# Patient Record
Sex: MALE | Race: WHITE | NOT HISPANIC OR LATINO | Employment: UNEMPLOYED | URBAN - METROPOLITAN AREA
[De-identification: names, ages, dates, MRNs, and addresses within clinical notes are randomized per-mention and may not be internally consistent; named-entity substitution may affect disease eponyms.]

---

## 2024-01-28 ENCOUNTER — HOSPITAL ENCOUNTER (INPATIENT)
Facility: HOSPITAL | Age: 54
LOS: 3 days | Discharge: HOME | End: 2024-02-02
Attending: EMERGENCY MEDICINE | Admitting: INTERNAL MEDICINE
Payer: COMMERCIAL

## 2024-01-28 ENCOUNTER — APPOINTMENT (OUTPATIENT)
Dept: RADIOLOGY | Facility: HOSPITAL | Age: 54
End: 2024-01-28
Payer: COMMERCIAL

## 2024-01-28 DIAGNOSIS — Z72.0 TOBACCO USE: ICD-10-CM

## 2024-01-28 DIAGNOSIS — T33.90XA FROSTBITE, INITIAL ENCOUNTER: Primary | ICD-10-CM
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
## 2024-01-28 DIAGNOSIS — T33.522A: ICD-10-CM

## 2024-01-28 DIAGNOSIS — T34.539A: ICD-10-CM

## 2024-01-28 LAB
ANION GAP SERPL CALC-SCNC: 11 MMOL/L
BASOPHILS # BLD AUTO: 0.05 X10*3/UL (ref 0–0.1)
BASOPHILS NFR BLD AUTO: 0.4 %
BUN SERPL-MCNC: 17 MG/DL (ref 8–25)
CALCIUM SERPL-MCNC: 9.4 MG/DL (ref 8.5–10.4)
CHLORIDE SERPL-SCNC: 96 MMOL/L (ref 97–107)
CO2 SERPL-SCNC: 26 MMOL/L (ref 24–31)
CREAT SERPL-MCNC: 0.8 MG/DL (ref 0.4–1.6)
EGFRCR SERPLBLD CKD-EPI 2021: >90 ML/MIN/1.73M*2
EOSINOPHIL # BLD AUTO: 0.04 X10*3/UL (ref 0–0.7)
EOSINOPHIL NFR BLD AUTO: 0.3 %
ERYTHROCYTE [DISTWIDTH] IN BLOOD BY AUTOMATED COUNT: 12.4 % (ref 11.5–14.5)
GLUCOSE SERPL-MCNC: 105 MG/DL (ref 65–99)
HCT VFR BLD AUTO: 48.7 % (ref 41–52)
HGB BLD-MCNC: 16.2 G/DL (ref 13.5–17.5)
IMM GRANULOCYTES # BLD AUTO: 0.05 X10*3/UL (ref 0–0.7)
IMM GRANULOCYTES NFR BLD AUTO: 0.4 % (ref 0–0.9)
LYMPHOCYTES # BLD AUTO: 0.91 X10*3/UL (ref 1.2–4.8)
LYMPHOCYTES NFR BLD AUTO: 7 %
MCH RBC QN AUTO: 33.1 PG (ref 26–34)
MCHC RBC AUTO-ENTMCNC: 33.3 G/DL (ref 32–36)
MCV RBC AUTO: 100 FL (ref 80–100)
MONOCYTES # BLD AUTO: 0.53 X10*3/UL (ref 0.1–1)
MONOCYTES NFR BLD AUTO: 4.1 %
NEUTROPHILS # BLD AUTO: 11.44 X10*3/UL (ref 1.2–7.7)
NEUTROPHILS NFR BLD AUTO: 87.8 %
NRBC BLD-RTO: 0 /100 WBCS (ref 0–0)
PLATELET # BLD AUTO: 262 X10*3/UL (ref 150–450)
POTASSIUM SERPL-SCNC: 4.4 MMOL/L (ref 3.4–5.1)
RBC # BLD AUTO: 4.89 X10*6/UL (ref 4.5–5.9)
SODIUM SERPL-SCNC: 133 MMOL/L (ref 133–145)
WBC # BLD AUTO: 13 X10*3/UL (ref 4.4–11.3)

## 2024-01-28 PROCEDURE — 73130 X-RAY EXAM OF HAND: CPT | Mod: LT,FR

## 2024-01-28 PROCEDURE — G0378 HOSPITAL OBSERVATION PER HR: HCPCS

## 2024-01-28 PROCEDURE — 2500000004 HC RX 250 GENERAL PHARMACY W/ HCPCS (ALT 636 FOR OP/ED): Performed by: PHYSICIAN ASSISTANT

## 2024-01-28 PROCEDURE — 90715 TDAP VACCINE 7 YRS/> IM: CPT | Performed by: PHYSICIAN ASSISTANT

## 2024-01-28 PROCEDURE — 90471 IMMUNIZATION ADMIN: CPT | Performed by: PHYSICIAN ASSISTANT

## 2024-01-28 PROCEDURE — 2500000001 HC RX 250 WO HCPCS SELF ADMINISTERED DRUGS (ALT 637 FOR MEDICARE OP): Performed by: INTERNAL MEDICINE

## 2024-01-28 PROCEDURE — 80048 BASIC METABOLIC PNL TOTAL CA: CPT | Performed by: PHYSICIAN ASSISTANT

## 2024-01-28 PROCEDURE — 99285 EMERGENCY DEPT VISIT HI MDM: CPT | Performed by: EMERGENCY MEDICINE

## 2024-01-28 PROCEDURE — 73130 X-RAY EXAM OF HAND: CPT | Mod: LEFT SIDE | Performed by: RADIOLOGY

## 2024-01-28 PROCEDURE — 85025 COMPLETE CBC W/AUTO DIFF WBC: CPT | Performed by: PHYSICIAN ASSISTANT

## 2024-01-28 PROCEDURE — 96365 THER/PROPH/DIAG IV INF INIT: CPT

## 2024-01-28 PROCEDURE — 36415 COLL VENOUS BLD VENIPUNCTURE: CPT | Performed by: PHYSICIAN ASSISTANT

## 2024-01-28 RX ORDER — HYDROCODONE BITARTRATE AND ACETAMINOPHEN 5; 325 MG/1; MG/1
1 TABLET ORAL EVERY 4 HOURS PRN
Status: DISCONTINUED | OUTPATIENT
Start: 2024-01-28 | End: 2024-02-02 | Stop reason: HOSPADM

## 2024-01-28 RX ORDER — TAMSULOSIN HYDROCHLORIDE 0.4 MG/1
0.4 CAPSULE ORAL
COMMUNITY
Start: 2023-10-06

## 2024-01-28 RX ORDER — LISINOPRIL 20 MG/1
20 TABLET ORAL
COMMUNITY
Start: 2023-10-06

## 2024-01-28 RX ORDER — GABAPENTIN 300 MG/1
300 CAPSULE ORAL 3 TIMES DAILY
COMMUNITY
Start: 2023-12-04

## 2024-01-28 RX ORDER — BUPRENORPHINE AND NALOXONE 8; 2 MG/1; MG/1
1 FILM, SOLUBLE BUCCAL; SUBLINGUAL 3 TIMES DAILY
COMMUNITY
Start: 2020-12-29

## 2024-01-28 RX ADMIN — TETANUS TOXOID, REDUCED DIPHTHERIA TOXOID AND ACELLULAR PERTUSSIS VACCINE, ADSORBED 0.5 ML: 5; 2.5; 8; 8; 2.5 SUSPENSION INTRAMUSCULAR at 21:51

## 2024-01-28 RX ADMIN — HYDROCODONE BITARTRATE AND ACETAMINOPHEN 1 TABLET: 5; 325 TABLET ORAL at 21:55

## 2024-01-28 RX ADMIN — CEFEPIME 1 G: 1 INJECTION, POWDER, FOR SOLUTION INTRAMUSCULAR; INTRAVENOUS at 20:45

## 2024-01-28 ASSESSMENT — LIFESTYLE VARIABLES
REASON UNABLE TO ASSESS: NO
EVER HAD A DRINK FIRST THING IN THE MORNING TO STEADY YOUR NERVES TO GET RID OF A HANGOVER: NO
EVER FELT BAD OR GUILTY ABOUT YOUR DRINKING: NO
HAVE YOU EVER FELT YOU SHOULD CUT DOWN ON YOUR DRINKING: NO
HAVE PEOPLE ANNOYED YOU BY CRITICIZING YOUR DRINKING: NO

## 2024-01-28 ASSESSMENT — COLUMBIA-SUICIDE SEVERITY RATING SCALE - C-SSRS
2. HAVE YOU ACTUALLY HAD ANY THOUGHTS OF KILLING YOURSELF?: NO
6. HAVE YOU EVER DONE ANYTHING, STARTED TO DO ANYTHING, OR PREPARED TO DO ANYTHING TO END YOUR LIFE?: NO
1. IN THE PAST MONTH, HAVE YOU WISHED YOU WERE DEAD OR WISHED YOU COULD GO TO SLEEP AND NOT WAKE UP?: NO

## 2024-01-28 ASSESSMENT — PAIN DESCRIPTION - PAIN TYPE: TYPE: ACUTE PAIN

## 2024-01-28 ASSESSMENT — PAIN SCALES - GENERAL: PAINLEVEL_OUTOF10: 8

## 2024-01-28 ASSESSMENT — PAIN DESCRIPTION - FREQUENCY: FREQUENCY: CONSTANT/CONTINUOUS

## 2024-01-28 ASSESSMENT — PAIN - FUNCTIONAL ASSESSMENT: PAIN_FUNCTIONAL_ASSESSMENT: 0-10

## 2024-01-29 LAB
ALBUMIN SERPL-MCNC: 3.3 G/DL (ref 3.5–5)
ALP BLD-CCNC: 80 U/L (ref 35–125)
ALT SERPL-CCNC: 44 U/L (ref 5–40)
ANION GAP SERPL CALC-SCNC: 12 MMOL/L
APPEARANCE UR: CLEAR
AST SERPL-CCNC: 76 U/L (ref 5–40)
BILIRUB SERPL-MCNC: 0.5 MG/DL (ref 0.1–1.2)
BILIRUB UR STRIP.AUTO-MCNC: NEGATIVE MG/DL
BUN SERPL-MCNC: 16 MG/DL (ref 8–25)
CALCIUM SERPL-MCNC: 9.1 MG/DL (ref 8.5–10.4)
CHLORIDE SERPL-SCNC: 99 MMOL/L (ref 97–107)
CO2 SERPL-SCNC: 24 MMOL/L (ref 24–31)
COLOR UR: YELLOW
CREAT SERPL-MCNC: 0.7 MG/DL (ref 0.4–1.6)
EGFRCR SERPLBLD CKD-EPI 2021: >90 ML/MIN/1.73M*2
ERYTHROCYTE [DISTWIDTH] IN BLOOD BY AUTOMATED COUNT: 12.2 % (ref 11.5–14.5)
GLUCOSE SERPL-MCNC: 106 MG/DL (ref 65–99)
GLUCOSE UR STRIP.AUTO-MCNC: NORMAL MG/DL
HCT VFR BLD AUTO: 46.2 % (ref 41–52)
HGB BLD-MCNC: 15.5 G/DL (ref 13.5–17.5)
KETONES UR STRIP.AUTO-MCNC: ABNORMAL MG/DL
LEUKOCYTE ESTERASE UR QL STRIP.AUTO: NEGATIVE
MCH RBC QN AUTO: 33 PG (ref 26–34)
MCHC RBC AUTO-ENTMCNC: 33.5 G/DL (ref 32–36)
MCV RBC AUTO: 98 FL (ref 80–100)
MUCOUS THREADS #/AREA URNS AUTO: NORMAL /LPF
NITRITE UR QL STRIP.AUTO: NEGATIVE
NRBC BLD-RTO: 0 /100 WBCS (ref 0–0)
PH UR STRIP.AUTO: 6 [PH]
PLATELET # BLD AUTO: 250 X10*3/UL (ref 150–450)
POTASSIUM SERPL-SCNC: 4.2 MMOL/L (ref 3.4–5.1)
PROT SERPL-MCNC: 7.1 G/DL (ref 5.9–7.9)
PROT UR STRIP.AUTO-MCNC: ABNORMAL MG/DL
RBC # BLD AUTO: 4.7 X10*6/UL (ref 4.5–5.9)
RBC # UR STRIP.AUTO: NEGATIVE /UL
RBC #/AREA URNS AUTO: NORMAL /HPF
SODIUM SERPL-SCNC: 135 MMOL/L (ref 133–145)
SP GR UR STRIP.AUTO: 1.03
UROBILINOGEN UR STRIP.AUTO-MCNC: ABNORMAL MG/DL
WBC # BLD AUTO: 8.6 X10*3/UL (ref 4.4–11.3)
WBC #/AREA URNS AUTO: NORMAL /HPF

## 2024-01-29 PROCEDURE — 85027 COMPLETE CBC AUTOMATED: CPT | Performed by: INTERNAL MEDICINE

## 2024-01-29 PROCEDURE — 97161 PT EVAL LOW COMPLEX 20 MIN: CPT | Mod: GP | Performed by: PHYSICAL THERAPIST

## 2024-01-29 PROCEDURE — S4991 NICOTINE PATCH NONLEGEND: HCPCS | Performed by: INTERNAL MEDICINE

## 2024-01-29 PROCEDURE — 2500000002 HC RX 250 W HCPCS SELF ADMINISTERED DRUGS (ALT 637 FOR MEDICARE OP, ALT 636 FOR OP/ED): Performed by: INTERNAL MEDICINE

## 2024-01-29 PROCEDURE — 97165 OT EVAL LOW COMPLEX 30 MIN: CPT | Mod: GO

## 2024-01-29 PROCEDURE — 84075 ASSAY ALKALINE PHOSPHATASE: CPT | Performed by: INTERNAL MEDICINE

## 2024-01-29 PROCEDURE — G0378 HOSPITAL OBSERVATION PER HR: HCPCS

## 2024-01-29 PROCEDURE — 2500000004 HC RX 250 GENERAL PHARMACY W/ HCPCS (ALT 636 FOR OP/ED)

## 2024-01-29 PROCEDURE — 2500000001 HC RX 250 WO HCPCS SELF ADMINISTERED DRUGS (ALT 637 FOR MEDICARE OP): Performed by: INTERNAL MEDICINE

## 2024-01-29 PROCEDURE — 81001 URINALYSIS AUTO W/SCOPE: CPT | Performed by: INTERNAL MEDICINE

## 2024-01-29 PROCEDURE — 36415 COLL VENOUS BLD VENIPUNCTURE: CPT | Performed by: INTERNAL MEDICINE

## 2024-01-29 PROCEDURE — 2500000004 HC RX 250 GENERAL PHARMACY W/ HCPCS (ALT 636 FOR OP/ED): Performed by: INTERNAL MEDICINE

## 2024-01-29 RX ORDER — BUPRENORPHINE AND NALOXONE 8; 2 MG/1; MG/1
1 FILM, SOLUBLE BUCCAL; SUBLINGUAL 3 TIMES DAILY
Status: DISCONTINUED | OUTPATIENT
Start: 2024-01-29 | End: 2024-02-02 | Stop reason: HOSPADM

## 2024-01-29 RX ORDER — VANCOMYCIN 1.75 G/350ML
1250 INJECTION, SOLUTION INTRAVENOUS EVERY 12 HOURS
Status: DISCONTINUED | OUTPATIENT
Start: 2024-01-29 | End: 2024-02-02 | Stop reason: HOSPADM

## 2024-01-29 RX ORDER — POLYETHYLENE GLYCOL 3350 17 G/17G
17 POWDER, FOR SOLUTION ORAL DAILY PRN
Status: DISCONTINUED | OUTPATIENT
Start: 2024-01-29 | End: 2024-02-02 | Stop reason: HOSPADM

## 2024-01-29 RX ORDER — LISINOPRIL 20 MG/1
20 TABLET ORAL DAILY
Status: DISCONTINUED | OUTPATIENT
Start: 2024-01-29 | End: 2024-02-02 | Stop reason: HOSPADM

## 2024-01-29 RX ORDER — GUAIFENESIN/DEXTROMETHORPHAN 100-10MG/5
5 SYRUP ORAL EVERY 4 HOURS PRN
Status: DISCONTINUED | OUTPATIENT
Start: 2024-01-29 | End: 2024-02-02 | Stop reason: HOSPADM

## 2024-01-29 RX ORDER — ACETAMINOPHEN 160 MG/5ML
650 SOLUTION ORAL EVERY 4 HOURS PRN
Status: DISCONTINUED | OUTPATIENT
Start: 2024-01-29 | End: 2024-02-02 | Stop reason: HOSPADM

## 2024-01-29 RX ORDER — IBUPROFEN 200 MG
1 TABLET ORAL DAILY
Status: DISCONTINUED | OUTPATIENT
Start: 2024-01-29 | End: 2024-02-02 | Stop reason: HOSPADM

## 2024-01-29 RX ORDER — MEROPENEM 1 G/1
1 INJECTION, POWDER, FOR SOLUTION INTRAVENOUS EVERY 8 HOURS
Status: DISCONTINUED | OUTPATIENT
Start: 2024-01-29 | End: 2024-02-02 | Stop reason: HOSPADM

## 2024-01-29 RX ORDER — GUAIFENESIN 600 MG/1
600 TABLET, EXTENDED RELEASE ORAL EVERY 12 HOURS PRN
Status: DISCONTINUED | OUTPATIENT
Start: 2024-01-29 | End: 2024-02-02 | Stop reason: HOSPADM

## 2024-01-29 RX ORDER — ACETAMINOPHEN 650 MG/1
650 SUPPOSITORY RECTAL EVERY 4 HOURS PRN
Status: DISCONTINUED | OUTPATIENT
Start: 2024-01-29 | End: 2024-02-02 | Stop reason: HOSPADM

## 2024-01-29 RX ORDER — ACETAMINOPHEN 325 MG/1
650 TABLET ORAL EVERY 4 HOURS PRN
Status: DISCONTINUED | OUTPATIENT
Start: 2024-01-29 | End: 2024-02-02 | Stop reason: HOSPADM

## 2024-01-29 RX ORDER — TAMSULOSIN HYDROCHLORIDE 0.4 MG/1
0.4 CAPSULE ORAL DAILY
Status: DISCONTINUED | OUTPATIENT
Start: 2024-01-29 | End: 2024-02-02 | Stop reason: HOSPADM

## 2024-01-29 RX ORDER — GABAPENTIN 300 MG/1
300 CAPSULE ORAL 3 TIMES DAILY
Status: DISCONTINUED | OUTPATIENT
Start: 2024-01-29 | End: 2024-02-02 | Stop reason: HOSPADM

## 2024-01-29 RX ADMIN — NICOTINE 1 PATCH: 21 PATCH, EXTENDED RELEASE TRANSDERMAL at 11:17

## 2024-01-29 RX ADMIN — HYDROCODONE BITARTRATE AND ACETAMINOPHEN 1 TABLET: 5; 325 TABLET ORAL at 17:47

## 2024-01-29 RX ADMIN — HYDROCODONE BITARTRATE AND ACETAMINOPHEN 1 TABLET: 5; 325 TABLET ORAL at 01:56

## 2024-01-29 RX ADMIN — VANCOMYCIN 1250 MG: 1.75 INJECTION, SOLUTION INTRAVENOUS at 22:00

## 2024-01-29 RX ADMIN — GABAPENTIN 300 MG: 300 CAPSULE ORAL at 15:42

## 2024-01-29 RX ADMIN — MEROPENEM 1 G: 1 INJECTION, POWDER, FOR SOLUTION INTRAVENOUS at 20:55

## 2024-01-29 RX ADMIN — GABAPENTIN 300 MG: 300 CAPSULE ORAL at 09:12

## 2024-01-29 RX ADMIN — TAMSULOSIN HYDROCHLORIDE 0.4 MG: 0.4 CAPSULE ORAL at 09:12

## 2024-01-29 RX ADMIN — HYDROCODONE BITARTRATE AND ACETAMINOPHEN 1 TABLET: 5; 325 TABLET ORAL at 11:17

## 2024-01-29 RX ADMIN — BUPRENORPHINE AND NALOXONE 1 FILM: 8; 2 FILM BUCCAL; SUBLINGUAL at 20:31

## 2024-01-29 RX ADMIN — BUPRENORPHINE AND NALOXONE 1 FILM: 8; 2 FILM BUCCAL; SUBLINGUAL at 09:12

## 2024-01-29 RX ADMIN — MEROPENEM 1 G: 1 INJECTION, POWDER, FOR SOLUTION INTRAVENOUS at 04:14

## 2024-01-29 RX ADMIN — GABAPENTIN 300 MG: 300 CAPSULE ORAL at 20:31

## 2024-01-29 RX ADMIN — MEROPENEM 1 G: 1 INJECTION, POWDER, FOR SOLUTION INTRAVENOUS at 11:17

## 2024-01-29 RX ADMIN — LISINOPRIL 20 MG: 20 TABLET ORAL at 09:12

## 2024-01-29 RX ADMIN — BUPRENORPHINE AND NALOXONE 1 FILM: 8; 2 FILM BUCCAL; SUBLINGUAL at 15:42

## 2024-01-29 SDOH — SOCIAL STABILITY: SOCIAL INSECURITY: DOES ANYONE TRY TO KEEP YOU FROM HAVING/CONTACTING OTHER FRIENDS OR DOING THINGS OUTSIDE YOUR HOME?: NO

## 2024-01-29 SDOH — ECONOMIC STABILITY: HOUSING INSECURITY
IN THE LAST 12 MONTHS, WAS THERE A TIME WHEN YOU DID NOT HAVE A STEADY PLACE TO SLEEP OR SLEPT IN A SHELTER (INCLUDING NOW)?: NO

## 2024-01-29 SDOH — ECONOMIC STABILITY: INCOME INSECURITY: HOW HARD IS IT FOR YOU TO PAY FOR THE VERY BASICS LIKE FOOD, HOUSING, MEDICAL CARE, AND HEATING?: SOMEWHAT HARD

## 2024-01-29 SDOH — ECONOMIC STABILITY: INCOME INSECURITY: IN THE LAST 12 MONTHS, WAS THERE A TIME WHEN YOU WERE NOT ABLE TO PAY THE MORTGAGE OR RENT ON TIME?: NO

## 2024-01-29 SDOH — SOCIAL STABILITY: SOCIAL INSECURITY: DO YOU FEEL ANYONE HAS EXPLOITED OR TAKEN ADVANTAGE OF YOU FINANCIALLY OR OF YOUR PERSONAL PROPERTY?: NO

## 2024-01-29 SDOH — SOCIAL STABILITY: SOCIAL INSECURITY: ARE THERE ANY APPARENT SIGNS OF INJURIES/BEHAVIORS THAT COULD BE RELATED TO ABUSE/NEGLECT?: NO

## 2024-01-29 SDOH — SOCIAL STABILITY: SOCIAL INSECURITY: HAS ANYONE EVER THREATENED TO HURT YOUR FAMILY OR YOUR PETS?: YES

## 2024-01-29 SDOH — SOCIAL STABILITY: SOCIAL INSECURITY: HAVE YOU HAD THOUGHTS OF HARMING ANYONE ELSE?: NO

## 2024-01-29 SDOH — SOCIAL STABILITY: SOCIAL INSECURITY: ABUSE: ADULT

## 2024-01-29 SDOH — ECONOMIC STABILITY: TRANSPORTATION INSECURITY
IN THE PAST 12 MONTHS, HAS THE LACK OF TRANSPORTATION KEPT YOU FROM MEDICAL APPOINTMENTS OR FROM GETTING MEDICATIONS?: NO

## 2024-01-29 SDOH — SOCIAL STABILITY: SOCIAL INSECURITY: ARE YOU OR HAVE YOU BEEN THREATENED OR ABUSED PHYSICALLY, EMOTIONALLY, OR SEXUALLY BY ANYONE?: YES

## 2024-01-29 SDOH — SOCIAL STABILITY: SOCIAL INSECURITY: DO YOU FEEL UNSAFE GOING BACK TO THE PLACE WHERE YOU ARE LIVING?: NO

## 2024-01-29 SDOH — ECONOMIC STABILITY: TRANSPORTATION INSECURITY
IN THE PAST 12 MONTHS, HAS LACK OF TRANSPORTATION KEPT YOU FROM MEETINGS, WORK, OR FROM GETTING THINGS NEEDED FOR DAILY LIVING?: YES

## 2024-01-29 SDOH — SOCIAL STABILITY: SOCIAL INSECURITY: WERE YOU ABLE TO COMPLETE ALL THE BEHAVIORAL HEALTH SCREENINGS?: YES

## 2024-01-29 ASSESSMENT — LIFESTYLE VARIABLES
HOW OFTEN DO YOU HAVE 6 OR MORE DRINKS ON ONE OCCASION: NEVER
PRESCIPTION_ABUSE_PAST_12_MONTHS: NO
AUDIT-C TOTAL SCORE: 0
AUDIT-C TOTAL SCORE: 0
SKIP TO QUESTIONS 9-10: 1
HOW OFTEN DO YOU HAVE A DRINK CONTAINING ALCOHOL: NEVER
SUBSTANCE_ABUSE_PAST_12_MONTHS: YES
HOW MANY STANDARD DRINKS CONTAINING ALCOHOL DO YOU HAVE ON A TYPICAL DAY: PATIENT DOES NOT DRINK

## 2024-01-29 ASSESSMENT — COGNITIVE AND FUNCTIONAL STATUS - GENERAL
DRESSING REGULAR UPPER BODY CLOTHING: A LITTLE
HELP NEEDED FOR BATHING: A LITTLE
DRESSING REGULAR LOWER BODY CLOTHING: A LITTLE
MOBILITY SCORE: 23
PATIENT BASELINE BEDBOUND: NO
CLIMB 3 TO 5 STEPS WITH RAILING: A LITTLE
EATING MEALS: A LITTLE
TOILETING: A LITTLE
TOILETING: A LITTLE
PERSONAL GROOMING: A LITTLE
MOBILITY SCORE: 24
EATING MEALS: A LITTLE
HELP NEEDED FOR BATHING: A LITTLE
EATING MEALS: A LITTLE
DAILY ACTIVITIY SCORE: 18
DRESSING REGULAR LOWER BODY CLOTHING: A LITTLE
DRESSING REGULAR LOWER BODY CLOTHING: A LITTLE
DAILY ACTIVITIY SCORE: 19
DAILY ACTIVITIY SCORE: 18
PERSONAL GROOMING: A LITTLE
PERSONAL GROOMING: A LITTLE
HELP NEEDED FOR BATHING: A LITTLE
DRESSING REGULAR UPPER BODY CLOTHING: A LITTLE
DRESSING REGULAR UPPER BODY CLOTHING: A LITTLE
MOBILITY SCORE: 24

## 2024-01-29 ASSESSMENT — ACTIVITIES OF DAILY LIVING (ADL)
DRESSING YOURSELF: NEEDS ASSISTANCE
WALKS IN HOME: INDEPENDENT
BATHING: INDEPENDENT
HEARING - LEFT EAR: FUNCTIONAL
JUDGMENT_ADEQUATE_SAFELY_COMPLETE_DAILY_ACTIVITIES: YES
ADL_ASSISTANCE: INDEPENDENT
TOILETING: INDEPENDENT
HEARING - RIGHT EAR: FUNCTIONAL
LACK_OF_TRANSPORTATION: NO
BATHING_ASSISTANCE: STAND BY
GROOMING: NEEDS ASSISTANCE
PATIENT'S MEMORY ADEQUATE TO SAFELY COMPLETE DAILY ACTIVITIES?: YES
ADEQUATE_TO_COMPLETE_ADL: YES
FEEDING YOURSELF: INDEPENDENT

## 2024-01-29 ASSESSMENT — PAIN SCALES - GENERAL
PAINLEVEL_OUTOF10: 7
PAINLEVEL_OUTOF10: 5 - MODERATE PAIN
PAINLEVEL_OUTOF10: 6
PAINLEVEL_OUTOF10: 6
PAINLEVEL_OUTOF10: 7
PAINLEVEL_OUTOF10: 6
PAINLEVEL_OUTOF10: 8
PAINLEVEL_OUTOF10: 8
PAINLEVEL_OUTOF10: 6

## 2024-01-29 ASSESSMENT — PAIN - FUNCTIONAL ASSESSMENT
PAIN_FUNCTIONAL_ASSESSMENT: 0-10

## 2024-01-29 ASSESSMENT — PATIENT HEALTH QUESTIONNAIRE - PHQ9
2. FEELING DOWN, DEPRESSED OR HOPELESS: NOT AT ALL
1. LITTLE INTEREST OR PLEASURE IN DOING THINGS: NOT AT ALL
SUM OF ALL RESPONSES TO PHQ9 QUESTIONS 1 & 2: 0

## 2024-01-29 ASSESSMENT — PAIN DESCRIPTION - DESCRIPTORS: DESCRIPTORS: ACHING
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
## 2024-01-29 NOTE — CONSULTS
Nutrition Assessement Note    Nutrition Assessment    Reason for Assessment: Provider consult order    Reason for Hospital Admission:  Chris Toribio is a 53 y.o. male who is admitted for discoloration to the distal digits of his left hand.   Pt not in room at time of visit.     Nutrition History:   N/a         Anthropometrics:  Ht: 182.9 cm (6'), Wt: 119 kg (263 lb), BMI: 35.66  IBW/kg (Dietitian Calculated): 80.91 kg  Percent of IBW: 147.08 %  Adjusted Body Weight (kg): 90.45 kg    Weight Change:              Daily Weight  01/28/24 : 119 kg (263 lb)      Nutrition Focused Physical Exam Findings:                       Nutrition Significant Labs:  Lab Results   Component Value Date    WBC 8.6 01/29/2024    HGB 15.5 01/29/2024    HCT 46.2 01/29/2024     01/29/2024    ALT 44 (H) 01/29/2024    AST 76 (H) 01/29/2024     01/29/2024    K 4.2 01/29/2024    CL 99 01/29/2024    CREATININE 0.70 01/29/2024    BUN 16 01/29/2024    CO2 24 01/29/2024    TSH 0.55 02/10/2023       Current Facility-Administered Medications:     acetaminophen (Tylenol) tablet 650 mg, 650 mg, oral, q4h PRN **OR** acetaminophen (Tylenol) oral liquid 650 mg, 650 mg, oral, q4h PRN **OR** acetaminophen (Tylenol) suppository 650 mg, 650 mg, rectal, q4h PRN, Jacob Gonzalez MD    benzocaine-menthol (Cepastat Sore Throat) 15-3.6 mg lozenge 1 lozenge, 1 lozenge, Mouth/Throat, q2h PRN, Jacob Gonzalez MD    buprenorphine-naloxone (Suboxone) 8-2 mg per SL film 1 Film, 1 Film, sublingual, TID, Jacob Gonzalez MD, 1 Film at 01/29/24 0912    dextromethorphan-guaifenesin (Robitussin DM)  mg/5 mL oral liquid 5 mL, 5 mL, oral, q4h PRN, Jacob Gonzalez MD    gabapentin (Neurontin) capsule 300 mg, 300 mg, oral, TID, Jacob Gonzalez MD, 300 mg at 01/29/24 0912    guaiFENesin (Mucinex) 12 hr tablet 600 mg, 600 mg, oral, q12h PRN, Jacob Gonzalez MD    HYDROcodone-acetaminophen (Norco) 5-325 mg per tablet 1 tablet, 1 tablet,  oral, q4h PRN, Jacob Gonzalez MD, 1 tablet at 01/29/24 1117    lisinopril tablet 20 mg, 20 mg, oral, Daily, Jacob Gonzalez MD, 20 mg at 01/29/24 0912    meropenem (Merrem) in sodium chloride 0.9 % 100 mL IV 1 g, 1 g, intravenous, q8h, Jacob Gonzalez MD, Stopped at 01/29/24 1147    nicotine (Nicoderm CQ) 21 mg/24 hr patch 1 patch, 1 patch, transdermal, Daily, Jacob Gonzalez MD, 1 patch at 01/29/24 1117    polyethylene glycol (Glycolax, Miralax) packet 17 g, 17 g, oral, Daily PRN, Jacob Gonzalez MD    tamsulosin (Flomax) 24 hr capsule 0.4 mg, 0.4 mg, oral, Daily, Jacob Gonzalez MD, 0.4 mg at 01/29/24 0912    Dietary Orders (From admission, onward)       Start     Ordered    01/29/24 0207  May Participate in Room Service  Once        Question:  .  Answer:  Yes    01/29/24 0207    01/29/24 0050  Adult diet Regular  Diet effective now        Question:  Diet type  Answer:  Regular    01/29/24 0049                  Estimated Needs:   Estimated Energy Needs  Total Energy Estimated Needs (kCal):  (7171-6692)  Total Estimated Energy Need per Day (kCal/kg):  (25-28)  Method for Estimating Needs: ABW    Estimated Protein Needs  Total Protein Estimated Needs (g):  ()  Total Protein Estimated Needs (g/kg):  (1-1.2)  Method for Estimating Needs: ABW    Estimated Fluid Needs  Total Fluid Estimated Needs (mL):  (1386-9174)  Method for Estimating Needs: 1 mL/kcal        Nutrition Diagnosis   Nutrition Diagnosis:       Nutrition Diagnosis  Patient has Nutrition Diagnosis: No       Nutrition Interventions/Recommendations   Nutrition Interventions and Recommendations:    Nutrition Prescription:  Individualized Nutrition Prescription Provided for : 1089-0882 kcals,  gm protein via diet    Nutrition Interventions:   Food and/or Nutrient Delivery Interventions  Interventions: Meals and snacks, Medical food supplement  Meals and Snacks: General healthful diet  Goal: Provide diet as ordered  Medical  Food Supplement: Commercial beverage  Goal: Will provide Tanner BID to aid in wound healing. Tanner provides an additional 90 kcals, 2.5 gm protein per serving.    Education Documentation  No documentation found.           Nutrition Monitoring and Evaluation   Monitoring/Evaluation:   Food/Nutrient Related History Monitoring  Monitoring and Evaluation Plan: Energy intake  Energy Intake: Estimated energy intake  Criteria: Pt to consume >/= 75% of estimated needs            Time Spent/Follow-up:   Follow Up  Time Spent (min): 20 minutes  Last Date of Nutrition Visit: 01/29/24  Nutrition Follow-Up Needed?: 3-5 days  Follow up Comment: 2/1/24

## 2024-01-29 NOTE — PROGRESS NOTES
"Vancomycin Dosing by Pharmacy- INITIAL    Chris Toribio is a 53 y.o. year old male who Pharmacy has been consulted for vancomycin dosing for cellulitis, skin and soft tissue. Based on the patient's indication and renal status this patient will be dosed based on a goal AUC of 400-600.     Renal function is currently stable.    Visit Vitals  /70 (BP Location: Left arm, Patient Position: Lying)   Pulse 86   Temp 36.3 °C (97.3 °F) (Oral)   Resp 17        Lab Results   Component Value Date    CREATININE 0.70 01/29/2024    CREATININE 0.80 01/28/2024    CREATININE 0.7 02/13/2023    CREATININE 0.8 02/12/2023    CREATININE 0.7 02/11/2023    CREATININE 0.8 02/10/2023        Patient weight is   Wt Readings from Last 1 Encounters:   01/28/24 119 kg (263 lb)       No results found for: \"CULTURE\"     I/O last 3 completed shifts:  In: 150 (1.3 mL/kg) [IV Piggyback:150]  Out: - (0 mL/kg)   Weight: 119.3 kg     Temp Readings from Last 1 Encounters:   01/29/24 36.3 °C (97.3 °F) (Oral)         Assessment/Plan     Patient will not be given a loading dose.  Will initiate vancomycin maintenance,  1250 mg every 12 hours.  First dose tonight, 1/29 @ 2000    This dosing regimen is predicted by [employer] to result in the following pharmacokinetic parameters:  Loading dose: N/A  Regimen: 1250 mg IV every 12 hours.  Start time: 17:34 on 01/29/2024  Exposure target: AUC24 (range)400-600 mg/L.hr   AUC24,ss: 451 mg/L.hr  Probability of AUC24 > 400: 63 %  Ctrough,ss: 14.3 mg/L  Probability of Ctrough,ss > 20: 22 %  Probability of nephrotoxicity (Lodise CHARLEY 2009): 9 %      Follow-up level will be ordered on 1/30 with AM labs, unless clinically indicated sooner.  Will continue to monitor renal function daily while on vancomycin and order serum creatinine at least every 48 hours if not already ordered.  Follow for continued vancomycin needs, clinical response, and signs/symptoms of toxicity.       Molina Arita, PharmD      "

## 2024-01-29 NOTE — PROGRESS NOTES
Physical Therapy    Physical Therapy Evaluation    Patient Name: Chris Toribio  MRN: [id number]  Today's Date: 1/29/2024   Time Calculation  Start Time: 1355  Stop Time: 1405  Time Calculation (min): 10 min    Assessment/Plan   PT Assessment  End of Session Patient Position:  (seated EOB with call light and needs within reach)  IP OR SWING BED PT PLAN  Inpatient or Swing Bed: Inpatient  PT Plan  PT Plan: PT Eval only  PT Eval Only Reason: At baseline function  PT Discharge Recommendations: No PT needed after discharge  PT Recommended Transfer Status: Independent  PT - OK to Discharge: Yes      Subjective   General Visit Information:  General  Reason for Referral: impaired functional mobility. This 53 year old male presented to the ED from home with c/o pain and discoloration of distal digits of L hand. He was admitted for frost bite of L hand.  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    Home Living:  Home Living  Type of Home: Apartment  Lives With:  (mother who is disabled)  Home Adaptive Equipment: Cane  Home Layout: One level  Home Access: Stairs to enter without rails  Entrance Stairs-Rails: None  Entrance Stairs-Number of Steps: 3  Bathroom Shower/Tub: Walk-in shower  Bathroom Equipment: Grab bars in shower, Shower chair with back (but back is kept off per his mother's preference)  Home Living Comments: pt takes care of his disabled mother    Prior Level of Function:  Prior Function Per Pt/Caregiver Report  Level of [place]: Independent with ADLs and functional transfers, Independent with homemaking with ambulation  ADL Assistance: Independent  Homemaking Assistance: Independent  Ambulatory Assistance: Independent (mod I with straight cane, including on stairs; multiple falls (~6) in past  "year, \"I trip over my own feet\")  Vocational: On disability  Hand Dominance: Right  Prior Function Comments: can drive but does have a vehicle    Precautions:  Precautions  Hearing/Visual Limitations: should wear glasses for distance but does not; hearing WFL  Medical Precautions: Fall precautions      Objective   Pain:  Pain Assessment  Pain Assessment: 0-10  Pain Score: 6  Pain Type: Acute pain  Pain Location: Finger (Comment which one) (2nd & 5th digits)  Pain Orientation: Left  Pain Interventions: Distraction, Ambulation/increased activity    Cognition:  Cognition  Overall Cognitive Status: Within Functional Limits  Orientation Level: Oriented X4    General Assessments:  General Observation  General Observation: L distal 2nd & fifth digits darkly discolored.    Activity Tolerance  Endurance: Endurance does not limit participation in activity    Sensation  Light Touch: Not tested (reported paresthesias in bilateral hands)    Strength  Strength Comments: BLE grossly >/=3+/5 based on observation of functional mobility    Perception  Motor Planning: Appears intact    Coordination  Movements are Fluid and Coordinated: Yes    Postural Control  Postural Control: Within Functional Limits    Static Sitting Balance  Static Sitting-Balance Support: Feet supported, No upper extremity supported  Static Sitting-Level of Assistance: Independent    Static Standing Balance  Static Standing-Balance Support: No upper extremity supported  Static Standing-Level of Assistance: Independent    Dynamic Standing Balance  Dynamic Standing-Balance Support: Right upper extremity supported  Dynamic Standing-Comments: mod I    Functional Assessments:  Transfers  Transfer: Yes  Transfer 1  Technique 1: Sit to stand, Stand to sit  Transfer Level of Assistance 1: Modified independent  Trials/Comments 1: x1 trial at EOB    Ambulation/Gait Training  Ambulation/Gait Training Performed: Yes  Ambulation/Gait Training 1  Surface 1: Level tile  Device " 1: Single point cane  Assistance 1: Modified independent  Comments/Distance (ft) 1: 40 ft using step through pattern with slowed to adequate velocity. Steady gait.    Extremity/Trunk Assessments:  RLE   RLE : Within Functional Limits  LLE   LLE : Within Functional Limits    Outcome Measures:  [facility] Basic Mobility  Turning from your back to your side while in a flat bed without using bedrails: None  Moving from lying on your back to sitting on the side of a flat bed without using bedrails: None  Moving to and from bed to chair (including a wheelchair): None  Standing up from a chair using your arms (e.g. wheelchair or bedside chair): None  To walk in hospital room: None  Climbing 3-5 steps with railing: A little  Basic Mobility - Total Score: 23    Encounter Problems       Encounter Problems (Active)       Pain - Adult              Education Documentation  No documentation found.  Education Comments  No comments found.

## 2024-01-29 NOTE — PROGRESS NOTES
Occupational Therapy    Evaluation    Patient Name: Chris Toribio  MRN: [id number]  : 1970  Today's Date: 24  Time Calculation  Start Time: 1100  Stop Time: 1120  Time Calculation (min): 20 min       Assessment:  OT Assessment: Referral received, chart reviewed, and evaluation complete.  Presents with an inabilty to use left (non dominant hand) as result of frostbite.  If set up is provided ADLs can be completed with Right hand only.  Provided a cane for independent mobility.  No further OT services indicated.     Subjective   Current Problem:  1. Frostbite, initial encounter        2. Frostbite with tissue necrosis of finger, unspecified laterality, initial encounter          General:   OT Received On: 24  General  Reason for Referral: decreased functional status  Referred By: Jacob Gonzalez MD  Past Medical History Relevant to Rehab: Reynauds disease, frostbite on left hand  Family/Caregiver Present: No  Prior to Session Communication: Bedside nurse  Patient Position Received: Bed, 2 rail up  General Comment: 53 year old WM admitted with c/o discoloration distal digits of left hand  Precautions:  Hearing/Visual Limitations: hearing and vision WFL  Vital Signs:     Pain:  Pain Assessment  Pain Assessment: 0-10  Pain Score: 6  Pain Type: Acute pain  Pain Location: Hand  Pain Orientation: Left    Objective   Cognition:  Overall Cognitive Status: Within Functional Limits  Orientation Level: Oriented X4     Home Living:  Type of Home: House  Lives With: Parent(s) (lives with his mother)  Home Layout: Two level  Home Access: Stairs to enter without rails  Entrance Stairs-Rails: None  Entrance Stairs-Number of Steps: 3  Bathroom Shower/Tub: Walk-in shower  Bathroom Toilet: Standard    Prior Function:  Level of Martin: Independent with ADLs and functional transfers, Independent with homemaking with ambulation  Vocational: On disability  Hand Dominance: Right  Prior Function Comments:  Patient takes care of his disabled mother at home     ADL:  Eating Assistance: Other (Comment) (set up)  Grooming Assistance:  (set up)  Bathing Assistance: Stand by  UE Dressing Assistance:  (set up)  LE Dressing Assistance: Stand by  Toileting Assistance with Device: Independent  Activities of Daily Living: LE Dressing  LE Dressing: Yes  Sock Level of Assistance: Independent  LE Dressing Where Assessed: Edge of bed    Toileting  Toileting Level of Assistance: Modified independent  Where Assessed: Toilet    Activity Tolerance:  Endurance: Endurance does not limit participation in activity       Bed Mobility/Transfers: Bed Mobility  Bed Mobility: Yes  Bed Mobility 1  Bed Mobility 1: Supine to sitting  Level of Assistance 1: Independent    Transfers  Transfer: Yes  Transfer 1  Transfer From 1: Bed to  Transfer to 1: Chair with arms  Technique 1: Sit to stand  Transfer Device 1: Cane  Transfer Level of Assistance 1: Close supervision  Transfers 2  Transfer From 2: Stand to  Transfer to 2: Toilet  Technique 2: Stand to sit  Transfer Device 2: Cane  Transfer Level of Assistance 2: Close supervision    Sitting Balance:  Static Sitting Balance  Static Sitting-Balance Support: Feet supported  Static Sitting-Level of Assistance: Independent    Therapy/Activity:  performed functional mobility to and from the bathroom with cane independently    Sensation:  Light Touch: Partial deficits in the LUE    Strength:  Strength Comments: RUe 4+/5, LUE n/t due to discoloration/necrosis    Coordination:  Movements are Fluid and Coordinated: Yes     Hand Function:  Hand Function  Gross Grasp: Impaired (left hand is not functional at this time)  Coordination: Impaired    Outcome Measures: [facility] Daily Activity  Putting on and taking off regular lower body clothing: A little  Bathing (including washing, rinsing, drying): A little  Putting on and taking off regular upper body clothing: A little  Toileting, which includes using toilet, bedpan  or urinal: None  Taking care of personal grooming such as brushing teeth: A little  Eating Meals: A little  Daily Activity - Total Score: 19

## 2024-01-30 PROBLEM — T33.90XA FROSTBITE, INITIAL ENCOUNTER: Status: ACTIVE | Noted: 2024-01-30

## 2024-01-30 LAB
HOLD SPECIMEN: NORMAL
VANCOMYCIN SERPL-MCNC: 8.3 UG/ML (ref 10–20)

## 2024-01-30 PROCEDURE — 2500000004 HC RX 250 GENERAL PHARMACY W/ HCPCS (ALT 636 FOR OP/ED)

## 2024-01-30 PROCEDURE — 2500000004 HC RX 250 GENERAL PHARMACY W/ HCPCS (ALT 636 FOR OP/ED): Performed by: INTERNAL MEDICINE

## 2024-01-30 PROCEDURE — 36415 COLL VENOUS BLD VENIPUNCTURE: CPT

## 2024-01-30 PROCEDURE — 2500000002 HC RX 250 W HCPCS SELF ADMINISTERED DRUGS (ALT 637 FOR MEDICARE OP, ALT 636 FOR OP/ED): Performed by: INTERNAL MEDICINE

## 2024-01-30 PROCEDURE — S4991 NICOTINE PATCH NONLEGEND: HCPCS | Performed by: INTERNAL MEDICINE

## 2024-01-30 PROCEDURE — 2500000001 HC RX 250 WO HCPCS SELF ADMINISTERED DRUGS (ALT 637 FOR MEDICARE OP): Performed by: INTERNAL MEDICINE

## 2024-01-30 PROCEDURE — 1200000002 HC GENERAL ROOM WITH TELEMETRY DAILY

## 2024-01-30 PROCEDURE — 80202 ASSAY OF VANCOMYCIN: CPT

## 2024-01-30 RX ADMIN — BUPRENORPHINE AND NALOXONE 1 FILM: 8; 2 FILM BUCCAL; SUBLINGUAL at 21:08

## 2024-01-30 RX ADMIN — BUPRENORPHINE AND NALOXONE 1 FILM: 8; 2 FILM BUCCAL; SUBLINGUAL at 15:06

## 2024-01-30 RX ADMIN — HYDROCODONE BITARTRATE AND ACETAMINOPHEN 1 TABLET: 5; 325 TABLET ORAL at 23:35

## 2024-01-30 RX ADMIN — MEROPENEM 1 G: 1 INJECTION, POWDER, FOR SOLUTION INTRAVENOUS at 03:27

## 2024-01-30 RX ADMIN — HYDROCODONE BITARTRATE AND ACETAMINOPHEN 1 TABLET: 5; 325 TABLET ORAL at 19:34

## 2024-01-30 RX ADMIN — NICOTINE 1 PATCH: 21 PATCH, EXTENDED RELEASE TRANSDERMAL at 08:41

## 2024-01-30 RX ADMIN — LISINOPRIL 20 MG: 20 TABLET ORAL at 08:41

## 2024-01-30 RX ADMIN — TAMSULOSIN HYDROCHLORIDE 0.4 MG: 0.4 CAPSULE ORAL at 08:41

## 2024-01-30 RX ADMIN — GABAPENTIN 300 MG: 300 CAPSULE ORAL at 15:06

## 2024-01-30 RX ADMIN — VANCOMYCIN 1250 MG: 1.75 INJECTION, SOLUTION INTRAVENOUS at 07:58

## 2024-01-30 RX ADMIN — MEROPENEM 1 G: 1 INJECTION, POWDER, FOR SOLUTION INTRAVENOUS at 11:55

## 2024-01-30 RX ADMIN — BUPRENORPHINE AND NALOXONE 1 FILM: 8; 2 FILM BUCCAL; SUBLINGUAL at 08:41

## 2024-01-30 RX ADMIN — HYDROCODONE BITARTRATE AND ACETAMINOPHEN 1 TABLET: 5; 325 TABLET ORAL at 00:36

## 2024-01-30 RX ADMIN — HYDROCODONE BITARTRATE AND ACETAMINOPHEN 1 TABLET: 5; 325 TABLET ORAL at 13:51

## 2024-01-30 RX ADMIN — GABAPENTIN 300 MG: 300 CAPSULE ORAL at 08:41

## 2024-01-30 RX ADMIN — VANCOMYCIN 1250 MG: 1.75 INJECTION, SOLUTION INTRAVENOUS at 21:09

## 2024-01-30 RX ADMIN — HYDROCODONE BITARTRATE AND ACETAMINOPHEN 1 TABLET: 5; 325 TABLET ORAL at 07:58

## 2024-01-30 RX ADMIN — MEROPENEM 1 G: 1 INJECTION, POWDER, FOR SOLUTION INTRAVENOUS at 21:08

## 2024-01-30 RX ADMIN — GABAPENTIN 300 MG: 300 CAPSULE ORAL at 21:08

## 2024-01-30 ASSESSMENT — PAIN SCALES - GENERAL
PAINLEVEL_OUTOF10: 8
PAINLEVEL_OUTOF10: 8
PAINLEVEL_OUTOF10: 5 - MODERATE PAIN
PAINLEVEL_OUTOF10: 8
PAINLEVEL_OUTOF10: 5 - MODERATE PAIN
PAINLEVEL_OUTOF10: 8

## 2024-01-30 ASSESSMENT — PAIN DESCRIPTION - ORIENTATION: ORIENTATION: LEFT

## 2024-01-30 ASSESSMENT — COGNITIVE AND FUNCTIONAL STATUS - GENERAL
DAILY ACTIVITIY SCORE: 24
MOBILITY SCORE: 24

## 2024-01-30 ASSESSMENT — PAIN - FUNCTIONAL ASSESSMENT
PAIN_FUNCTIONAL_ASSESSMENT: 0-10

## 2024-01-30 ASSESSMENT — PAIN DESCRIPTION - LOCATION: LOCATION: HAND

## 2024-01-30 ASSESSMENT — PAIN DESCRIPTION - DESCRIPTORS
DESCRIPTORS: ACHING
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
## 2024-01-30 NOTE — NURSING NOTE
Assume care of patient. Patient is A&Ox4 up ad linda. Call light in reach. Patient able to make needs known. Patient has frost bite on fingers on left hand.

## 2024-01-30 NOTE — CARE PLAN
The patient's goals for the shift include      The clinical goals for the shift include pain management      Problem: Pain  Goal: Takes deep breaths with improved pain control throughout the shift  Outcome: Progressing  Goal: Turns in bed with improved pain control throughout the shift  Outcome: Progressing  Goal: Walks with improved pain control throughout the shift  Outcome: Progressing  Goal: Performs ADL's with improved pain control throughout shift  Outcome: Progressing  Goal: Participates in PT with improved pain control throughout the shift  Outcome: Progressing  Goal: Free from opioid side effects throughout the shift  Outcome: Progressing  Goal: Free from acute confusion related to pain meds throughout the shift  Outcome: Progressing     Problem: Pain - Adult  Goal: Verbalizes/displays adequate comfort level or baseline comfort level  Outcome: Progressing     Problem: Safety - Adult  Goal: Free from fall injury  Outcome: Progressing     Problem: Discharge Planning  Goal: Discharge to home or other facility with appropriate resources  Outcome: Progressing     Problem: Chronic Conditions and Co-morbidities  Goal: Patient's chronic conditions and co-morbidity symptoms are monitored and maintained or improved  Outcome: Progressing     Problem: Nutrition  Goal: Promote healing  Outcome: Progressing  Goal: Maintain stable weight  Outcome: Progressing  Goal: Reduce weight from edema/fluid  Outcome: Progressing  Goal: Gradual weight gain  Outcome: Progressing

## 2024-01-30 NOTE — CARE PLAN
The patient's goals for the shift include      The clinical goals for the shift include pain management    Over the shift, the patient did not make progress toward the following goals. Barriers to progression include tissue necrosis. Recommendations to address these barriers include follow prescribed pain management regimen.

## 2024-01-30 NOTE — NURSING NOTE
Patient up walking around in room. No s/s of distress or SOB. Patient complains of pain in his left hand. Patient gets scheduled medication to help with pain and prn medication. Patient pleasant and cooperative.

## 2024-01-30 NOTE — PROGRESS NOTES
Chris Toribio is a 53 y.o. male on day 0 of admission presenting with Frostbite of hand, left, initial encounter.       Met with patient at bedside.  Declines homegoing needs. Per ortho note, patient will follow up outpatient in two weeks for possible index finger amputation.  On IV antibiotics.  Will transition to oral antibiotics when discharged.     DC PLAN SECURE                    Rossi Valero RN

## 2024-01-30 NOTE — PROGRESS NOTES
Vancomycin Dosing by Pharmacy- FOLLOW UP    Chris Toribio is a 53 y.o. year old male who Pharmacy has been consulted for vancomycin dosing for cellulitis, skin and soft tissue. Based on the patient's indication and renal status this patient is being dosed based on a goal AUC of 400-600.     Renal function is currently stable.    Current vancomycin dose: 1250 mg given every 12 hours    Estimated vancomycin AUC on current dose: 478 mg/L.hr     Visit Vitals  /64 (BP Location: Right arm, Patient Position: Sitting)   Pulse 94   Temp 37.4 °C (99.3 °F) (Oral)   Resp 19        Lab Results   Component Value Date    CREATININE 0.70 01/29/2024    CREATININE 0.80 01/28/2024    CREATININE 0.7 02/13/2023    CREATININE 0.8 02/12/2023    CREATININE 0.7 02/11/2023    CREATININE 0.8 02/10/2023      I/O last 3 completed shifts:  In: 700 (5.9 mL/kg) [IV Piggyback:700]  Out: - (0 mL/kg)   Weight: 119.3 kg     Assessment/Plan    Within goal AUC range. Continue current vancomycin regimen.    This dosing regimen is predicted by [employer] to result in the following pharmacokinetic parameters:    Regimen: 1250 mg IV every 12 hours.  Exposure target: AUC24 (range)400-600 mg/L.hr   AUC24,ss: 478 mg/L.hr  Probability of AUC24 > 400: 76 %  Ctrough,ss: 14.9 mg/L  Probability of Ctrough,ss > 20: 18 %  Probability of nephrotoxicity (Lodise CHARLEY 2009): 10 %      The next level will be obtained on 2/5 at AM labs. May be obtained sooner if clinically indicated.   Will continue to monitor renal function daily while on vancomycin and order serum creatinine at least every 48 hours if not already ordered.  Follow for continued vancomycin needs, clinical response, and signs/symptoms of toxicity.       Mya Daniels, PharmD

## 2024-01-30 NOTE — PROGRESS NOTES
Chris Toribio is a 53 y.o. male on day 0 of admission presenting with Frostbite of hand, left, initial encounter.    Subjective   Interval History:   Afebrile, no chills  Moderate to severe left hand pain  No chest pain or shortness of breath  No nausea vomiting and diarrhea    Review of Systems   All other systems reviewed and are negative.      Objective   Range of Vitals (last 24 hours)  Heart Rate:  []   Temp:  [36.3 °C (97.3 °F)-37.4 °C (99.3 °F)]   Resp:  [17-19]   BP: (110-126)/(64-87)   SpO2:  [90 %-96 %]   Daily Weight  01/28/24 : 119 kg (263 lb)    Body mass index is 35.67 kg/m².    Physical Exam    Constitutional:       Appearance: Normal appearance.   HENT:      Head: Normocephalic and atraumatic.      Nose: Nose normal.   Eyes:      Extraocular Movements: Extraocular movements intact.      Conjunctiva/sclera: Conjunctivae normal.   Cardiovascular:      Rate and Rhythm: Regular rhythm.      Heart sounds: Normal heart sounds.   Pulmonary:      Breath sounds: Normal breath sounds.   Abdominal:      General: Bowel sounds are normal.      Palpations: Abdomen is soft.   Musculoskeletal:      Cervical back: Normal range of motion and neck supple.   Skin:     Comments: Left thumb, index, and fifth finger distal phalanx necrotic digits   Neurological:      Mental Status: He is alert.   Antibiotics  cefepime (Maxipime) 1 g in dextrose 5 % 50 mL IV  diphth,pertus(acell),tetanus (BoostRIX) 2.5-8-5 Lf-mcg-Lf/0.5mL vaccine 0.5 mL    gabapentin (Neurontin) capsule  lisinopril (Prinivil, Zestril) tablet  tamsulosin (Flomax) 24 hr capsule  HYDROcodone-acetaminophen (Norco) 5-325 mg per tablet 1 tablet  buprenorphine-naloxone (Suboxone) 8-2 mg per SL film 1 Film  gabapentin (Neurontin) capsule 300 mg  lisinopril tablet 20 mg  tamsulosin (Flomax) 24 hr capsule 0.4 mg  acetaminophen (Tylenol) tablet 650 mg  acetaminophen (Tylenol) oral liquid 650 mg  acetaminophen (Tylenol) suppository 650 mg  polyethylene glycol  "(Glycolax, Miralax) packet 17 g  benzocaine-menthol (Cepastat Sore Throat) 15-3.6 mg lozenge 1 lozenge  dextromethorphan-guaifenesin (Robitussin DM)  mg/5 mL oral liquid 5 mL  guaiFENesin (Mucinex) 12 hr tablet 600 mg  meropenem (Merrem) in sodium chloride 0.9 % 100 mL IV 1 g  nicotine (Nicoderm CQ) 21 mg/24 hr patch 1 patch  vancomycin-diluent combo no.1 (Xellia) IVPB 1,250 mg      Relevant Results  Labs  Results from last 72 hours   Lab Units 01/29/24  0551 01/28/24  2104   WBC AUTO x10*3/uL 8.6 13.0*   HEMOGLOBIN g/dL 15.5 16.2   HEMATOCRIT % 46.2 48.7   PLATELETS AUTO x10*3/uL 250 262   NEUTROS PCT AUTO %  --  87.8   LYMPHS PCT AUTO %  --  7.0   MONOS PCT AUTO %  --  4.1   EOS PCT AUTO %  --  0.3     Results from last 72 hours   Lab Units 01/29/24  0551 01/28/24  2104   SODIUM mmol/L 135 133   POTASSIUM mmol/L 4.2 4.4   CHLORIDE mmol/L 99 96*   CO2 mmol/L 24 26   BUN mg/dL 16 17   CREATININE mg/dL 0.70 0.80   GLUCOSE mg/dL 106* 105*   CALCIUM mg/dL 9.1 9.4   ANION GAP mmol/L 12 11   EGFR mL/min/1.73m*2 >90 >90     Results from last 72 hours   Lab Units 01/29/24  0551   ALK PHOS U/L 80   BILIRUBIN TOTAL mg/dL 0.5   PROTEIN TOTAL g/dL 7.1   ALT U/L 44*   AST U/L 76*   ALBUMIN g/dL 3.3*     Estimated Creatinine Clearance: 125 mL/min (by C-G formula based on SCr of 0.7 mg/dL).  No results found for: \"CRP\"  Microbiology  Reviewed  Imaging  XR hand left 3+ views    Result Date: 1/28/2024  STUDY: Hand Radiographs; 1/28/2024, 8:01PM INDICATION: Injury. COMPARISON: None Available. ACCESSION NUMBER(S): [id number] ORDERING CLINICIAN: JORJE KEENAN TECHNIQUE:  Four view(s) of the left hand. FINDINGS:  There is no detectable displaced fracture or dislocation. There is minimal swelling especially apparent in the dorsum of the hand.    Minimal swelling. No detectable displaced fracture. Remainder as above. Signed by Pa Garsia MD     Assessment/Plan   Left thumb, index finger and fifth finger frostbite  Allergy to " penicillin-tolerating meropenem     Continue meropenem-empiric  IV vancomycin-empiric  Pain management.  Hand surgery consult  Monitor evolution of frostbite  Supportive care  Monitor temperature and WBC  Long-term plan is oral antibiotic-considering doxycycline and Levaquin therapy with follow-up with hand surgeon in office         Robert Knapp MD

## 2024-01-30 NOTE — PROGRESS NOTES
Chris Toribio is a 53 y.o. male on day 0 of admission presenting with Frostbite of hand, left, initial encounter.    Subjective   Patient was seen and examined.  Lying in the bed.  Comfortable.  Patient denies any acute distress.  Denies any headache or dizziness.  Still complaining of pain in the left hand.       Objective     Physical Exam  HEENT:  Head externally atraumatic, no pallor, no icterus, extraocular movements intact, pupils reacting to light, oral mucosa moist and throat clear.  Neck:  Supple, no JVP, no palpable adenopathy or thyromegaly.  No carotid bruit.  Chest:  Clear to auscultation and resonant.  Heart:  Regular rate and rhythm, no murmur or gallop could be appreciated.  Abdomen:  Soft, nontender, bowel sounds present, normoactive, no palpable hepatosplenomegaly.  Extremities:  No edema, pulses present, no cyanosis or clubbing.  Necrosis of tip of little finger, third finger, intact finger and thumb.  Worse on the left finger and index finger.  CNS:  Patient alert, oriented to time, place and person.  Power 5/5 all over and deep tendon reflexes symmetrical, cranial nerves 2-12 grossly intact.  Skin:  No active rash.  Musculoskeletal:  No joint swelling or erythema, range of movement normal.  Last Recorded Vitals  Heart Rate:  []   Temp:  [36.3 °C (97.3 °F)-37.4 °C (99.3 °F)]   Resp:  [17-19]   BP: (110-126)/(64-87)   SpO2:  [90 %-96 %]     Intake/Output last 3 Shifts:  I/O last 3 completed shifts:  In: 700 (5.9 mL/kg) [IV Piggyback:700]  Out: - (0 mL/kg)   Weight: 119.3 kg     Relevant Results  No results found for the last 90 days.    Results for orders placed or performed during the hospital encounter of 01/28/24 (from the past 24 hour(s))   Vancomycin   Result Value Ref Range    Vancomycin 8.3 (L) 10.0 - 20.0 ug/mL   Lavender Top   Result Value Ref Range    Extra Tube Hold for add-ons.         Current Facility-Administered Medications:     acetaminophen (Tylenol) tablet 650 mg, 650  mg, oral, q4h PRN **OR** acetaminophen (Tylenol) oral liquid 650 mg, 650 mg, oral, q4h PRN **OR** acetaminophen (Tylenol) suppository 650 mg, 650 mg, rectal, q4h PRN, Jacob Gonzalez MD    benzocaine-menthol (Cepastat Sore Throat) 15-3.6 mg lozenge 1 lozenge, 1 lozenge, Mouth/Throat, q2h PRN, Jacob Gonzalez MD    buprenorphine-naloxone (Suboxone) 8-2 mg per SL film 1 Film, 1 Film, sublingual, TID, Jacob Gonzalez MD, 1 Film at 01/30/24 1506    dextromethorphan-guaifenesin (Robitussin DM)  mg/5 mL oral liquid 5 mL, 5 mL, oral, q4h PRN, Jacob Gonzalez MD    gabapentin (Neurontin) capsule 300 mg, 300 mg, oral, TID, Jacob Gonzalez MD, 300 mg at 01/30/24 1506    guaiFENesin (Mucinex) 12 hr tablet 600 mg, 600 mg, oral, q12h PRN, Jacob Gonzalez MD    HYDROcodone-acetaminophen (Norco) 5-325 mg per tablet 1 tablet, 1 tablet, oral, q4h PRN, Jacbo Gonzalez MD, 1 tablet at 01/30/24 1351    lisinopril tablet 20 mg, 20 mg, oral, Daily, Jacob Gonzalez MD, 20 mg at 01/30/24 0841    meropenem (Merrem) in sodium chloride 0.9 % 100 mL IV 1 g, 1 g, intravenous, q8h, Jacob Gonzalez MD, Stopped at 01/30/24 1225    nicotine (Nicoderm CQ) 21 mg/24 hr patch 1 patch, 1 patch, transdermal, Daily, Jacob Gonzalez MD, 1 patch at 01/30/24 0841    polyethylene glycol (Glycolax, Miralax) packet 17 g, 17 g, oral, Daily PRN, Jacob Gonzalez MD    tamsulosin (Flomax) 24 hr capsule 0.4 mg, 0.4 mg, oral, Daily, Jacob Gonzalez MD, 0.4 mg at 01/30/24 0841    vancomycin-diluent combo no.1 (Xellia) IVPB 1,250 mg, 1,250 mg, intravenous, q12h, Molina Arita, [facility], [facility], Stopped at 01/30/24 0913   Assessment/Plan   Principal Problem:    Frostbite of hand, left, initial encounter  Active Problems:    Frostbite, initial encounter  Current medications.  Continue antibiotic.  Local wound care.  Hand surgery and ID input appreciated.  Discharge soon.         Jacob Gonzalez MD

## 2024-01-30 NOTE — CONSULTS
Reason For Consult  Frostbite injury left hand    History Of Present Illness  Chris Toribio is a 53 y.o. male presenting with proximate injury to the left hand.  Patient states he had a prolonged cold exposure outside approximately 10 days prior injury and left hand.  States he was initially seen by his primary care doctor who was wondering if his Raynaud's phenomenon but present to the hospital when the pain became worse.  Was found to have apparent necrosis of the skin of his fingers and is admitted to the hospital for further treatment here states that the fingers remain fairly painful no numbness or tingling aside from the index finger.  No other treatments for this thus far     Past Medical History  He has a past medical history of HTN (hypertension).    Surgical History  He has a past surgical history that includes Cholecystectomy.     Social History  He reports that he has been smoking cigarettes. He has been smoking an average of 3 packs per day. He has never used smokeless tobacco. He reports current drug use. Drug: Marijuana. He reports that he does not drink alcohol.    Family History  No family history on file.     Allergies  Penicillin    Review of Systems  Denies aside from left hand pain     Physical Exam  Normocephalic atraumatic abdomen soft nontender respirations nonlabored heart rate regular rate and rhythm  Left hand: Necrotic appearing tip of the index finger to portion of the level of middle phalanx with necrotic pad over the tip of the small finger insulin discoloration over the tip of the thumb but no gross tissue necrosis there is absent sensation of the tip of the index finger but intact of the remaining digits able to fully flex and extend the fingers aside from the index finger no erythema no purulence able to be expressed or other open wounds or sores.  Brisk cap refill to the remaining fingers     Last Recorded Vitals  Blood pressure 117/87, pulse 101, temperature 37.3 °C (99.1 °F),  temperature source Oral, resp. rate 18, height 1.829 m (6'), weight 119 kg (263 lb), SpO2 90 %.         Assessment/Plan     53-year-old male with approximate injury to the left hand.  Discussed with the patient at this point we did recommend local wound care for the time being.  Will allow adequate time for tissues to recover but very realistically he will be likely needing an eventual amputation of the tip of the index finger.  Thumb and small finger would likely recover.  Would recommend some Betadine over the necrotic areas of skin once a day for now follow-up in the office in approximately 2 weeks for repeat evaluation and planning for potential amputation on an outpatient basis pending soft tissue recovery at that point.    I spent 30 minutes in the professional and overall care of this patient.      Tirso Craig MD

## 2024-01-31 PROCEDURE — 2500000002 HC RX 250 W HCPCS SELF ADMINISTERED DRUGS (ALT 637 FOR MEDICARE OP, ALT 636 FOR OP/ED): Performed by: INTERNAL MEDICINE

## 2024-01-31 PROCEDURE — 1200000002 HC GENERAL ROOM WITH TELEMETRY DAILY

## 2024-01-31 PROCEDURE — 2500000004 HC RX 250 GENERAL PHARMACY W/ HCPCS (ALT 636 FOR OP/ED): Performed by: INTERNAL MEDICINE

## 2024-01-31 PROCEDURE — 2500000001 HC RX 250 WO HCPCS SELF ADMINISTERED DRUGS (ALT 637 FOR MEDICARE OP): Performed by: INTERNAL MEDICINE

## 2024-01-31 PROCEDURE — S4991 NICOTINE PATCH NONLEGEND: HCPCS | Performed by: INTERNAL MEDICINE

## 2024-01-31 PROCEDURE — 2500000004 HC RX 250 GENERAL PHARMACY W/ HCPCS (ALT 636 FOR OP/ED)

## 2024-01-31 RX ADMIN — HYDROCODONE BITARTRATE AND ACETAMINOPHEN 1 TABLET: 5; 325 TABLET ORAL at 12:04

## 2024-01-31 RX ADMIN — MEROPENEM 1 G: 1 INJECTION, POWDER, FOR SOLUTION INTRAVENOUS at 11:01

## 2024-01-31 RX ADMIN — GABAPENTIN 300 MG: 300 CAPSULE ORAL at 09:08

## 2024-01-31 RX ADMIN — BUPRENORPHINE AND NALOXONE 1 FILM: 8; 2 FILM BUCCAL; SUBLINGUAL at 09:07

## 2024-01-31 RX ADMIN — LISINOPRIL 20 MG: 20 TABLET ORAL at 09:08

## 2024-01-31 RX ADMIN — BUPRENORPHINE AND NALOXONE 1 FILM: 8; 2 FILM BUCCAL; SUBLINGUAL at 14:48

## 2024-01-31 RX ADMIN — VANCOMYCIN 1250 MG: 1.75 INJECTION, SOLUTION INTRAVENOUS at 20:21

## 2024-01-31 RX ADMIN — HYDROCODONE BITARTRATE AND ACETAMINOPHEN 1 TABLET: 5; 325 TABLET ORAL at 16:04

## 2024-01-31 RX ADMIN — TAMSULOSIN HYDROCHLORIDE 0.4 MG: 0.4 CAPSULE ORAL at 09:07

## 2024-01-31 RX ADMIN — GABAPENTIN 300 MG: 300 CAPSULE ORAL at 20:46

## 2024-01-31 RX ADMIN — HYDROCODONE BITARTRATE AND ACETAMINOPHEN 1 TABLET: 5; 325 TABLET ORAL at 07:59

## 2024-01-31 RX ADMIN — GABAPENTIN 300 MG: 300 CAPSULE ORAL at 14:48

## 2024-01-31 RX ADMIN — VANCOMYCIN 1250 MG: 1.75 INJECTION, SOLUTION INTRAVENOUS at 07:54

## 2024-01-31 RX ADMIN — MEROPENEM 1 G: 1 INJECTION, POWDER, FOR SOLUTION INTRAVENOUS at 20:21

## 2024-01-31 RX ADMIN — BUPRENORPHINE AND NALOXONE 1 FILM: 8; 2 FILM BUCCAL; SUBLINGUAL at 20:46

## 2024-01-31 RX ADMIN — NICOTINE 1 PATCH: 21 PATCH, EXTENDED RELEASE TRANSDERMAL at 09:07

## 2024-01-31 RX ADMIN — HYDROCODONE BITARTRATE AND ACETAMINOPHEN 1 TABLET: 5; 325 TABLET ORAL at 03:32

## 2024-01-31 RX ADMIN — HYDROCODONE BITARTRATE AND ACETAMINOPHEN 1 TABLET: 5; 325 TABLET ORAL at 20:21

## 2024-01-31 RX ADMIN — MEROPENEM 1 G: 1 INJECTION, POWDER, FOR SOLUTION INTRAVENOUS at 03:32

## 2024-01-31 ASSESSMENT — PAIN DESCRIPTION - DESCRIPTORS
DESCRIPTORS: ACHING

## 2024-01-31 ASSESSMENT — PAIN - FUNCTIONAL ASSESSMENT
PAIN_FUNCTIONAL_ASSESSMENT: 0-10

## 2024-01-31 ASSESSMENT — COGNITIVE AND FUNCTIONAL STATUS - GENERAL
TOILETING: A LITTLE
DAILY ACTIVITIY SCORE: 19
DRESSING REGULAR UPPER BODY CLOTHING: A LITTLE
CLIMB 3 TO 5 STEPS WITH RAILING: A LITTLE
HELP NEEDED FOR BATHING: A LITTLE
DRESSING REGULAR LOWER BODY CLOTHING: A LITTLE
MOBILITY SCORE: 23
PERSONAL GROOMING: A LITTLE

## 2024-01-31 ASSESSMENT — PAIN SCALES - GENERAL
PAINLEVEL_OUTOF10: 8
PAINLEVEL_OUTOF10: 4
PAINLEVEL_OUTOF10: 4
PAINLEVEL_OUTOF10: 2
PAINLEVEL_OUTOF10: 8
PAINLEVEL_OUTOF10: 8
PAINLEVEL_OUTOF10: 6
PAINLEVEL_OUTOF10: 3
PAINLEVEL_OUTOF10: 7
PAINLEVEL_OUTOF10: 4

## 2024-01-31 ASSESSMENT — PAIN DESCRIPTION - ORIENTATION
ORIENTATION: LEFT

## 2024-01-31 ASSESSMENT — PAIN DESCRIPTION - LOCATION
LOCATION: HAND

## 2024-01-31 ASSESSMENT — ACTIVITIES OF DAILY LIVING (ADL)
EFFECT OF PAIN ON DAILY ACTIVITIES: DISCOMFORT

## 2024-01-31 NOTE — PROGRESS NOTES
Chris Toribio is a 53 y.o. male on day 1 of admission presenting with Frostbite of hand, left, initial encounter.    Subjective   The patient was seen and examined.  Lying in the bed.  Comfortable.  Still complaining of pain in the hand.       Objective     Physical Exam  HEENT:  Head externally atraumatic, no pallor, no icterus, extraocular movements intact, pupils reacting to light, oral mucosa moist and throat clear.  Neck:  Supple, no JVP, no palpable adenopathy or thyromegaly.  No carotid bruit.  Chest:  Clear to auscultation and resonant.  Heart:  Regular rate and rhythm, no murmur or gallop could be appreciated.  Abdomen:  Soft, nontender, bowel sounds present, normoactive, no palpable hepatosplenomegaly.  Extremities: Left index and little finger necrosis.  Necrosis in the thumb and middle finger is improving.    CNS:  Patient alert, oriented to time, place and person.  Power 5/5 all over and deep tendon reflexes symmetrical, cranial nerves 2-12 grossly intact.  Skin:  No active rash.  Musculoskeletal:  No joint swelling or erythema, range of movement normal.  Last Recorded Vitals  Heart Rate:  [79-97]   Temp:  [36.3 °C (97.3 °F)-37.5 °C (99.5 °F)]   Resp:  [18-19]   BP: (112-146)/(67-88)   SpO2:  [89 %-97 %]     Intake/Output last 3 Shifts:  I/O last 3 completed shifts:  In: 1925 (16.1 mL/kg) [P.O.:1025; IV Piggyback:900]  Out: - (0 mL/kg)   Weight: 119.3 kg     Relevant Results  No results found for the last 90 days.    No results found for this or any previous visit (from the past 24 hour(s)).     Current Facility-Administered Medications:     acetaminophen (Tylenol) tablet 650 mg, 650 mg, oral, q4h PRN **OR** acetaminophen (Tylenol) oral liquid 650 mg, 650 mg, oral, q4h PRN **OR** acetaminophen (Tylenol) suppository 650 mg, 650 mg, rectal, q4h PRN, Jacob Gonzalez MD    benzocaine-menthol (Cepastat Sore Throat) 15-3.6 mg lozenge 1 lozenge, 1 lozenge, Mouth/Throat, q2h PRN, Jacob Gonzalez,  MD    buprenorphine-naloxone (Suboxone) 8-2 mg per SL film 1 Film, 1 Film, sublingual, TID, Jacob Gonzalez MD, 1 Film at 01/31/24 0907    dextromethorphan-guaifenesin (Robitussin DM)  mg/5 mL oral liquid 5 mL, 5 mL, oral, q4h PRN, Jacob Gonzalez MD    gabapentin (Neurontin) capsule 300 mg, 300 mg, oral, TID, Jacob Gonzalez MD, 300 mg at 01/31/24 0908    guaiFENesin (Mucinex) 12 hr tablet 600 mg, 600 mg, oral, q12h PRN, Jacob Gonzalez MD    HYDROcodone-acetaminophen (Norco) 5-325 mg per tablet 1 tablet, 1 tablet, oral, q4h PRN, Jacob Gonzalez MD, 1 tablet at 01/31/24 1204    lisinopril tablet 20 mg, 20 mg, oral, Daily, Jacob Gonzalez MD, 20 mg at 01/31/24 0908    meropenem (Merrem) in sodium chloride 0.9 % 100 mL IV 1 g, 1 g, intravenous, q8h, Jacob Gonzalez MD, Stopped at 01/31/24 1131    nicotine (Nicoderm CQ) 21 mg/24 hr patch 1 patch, 1 patch, transdermal, Daily, Jacob Gonzalez MD, 1 patch at 01/31/24 0907    polyethylene glycol (Glycolax, Miralax) packet 17 g, 17 g, oral, Daily PRN, Jacob Gonzalez MD    tamsulosin (Flomax) 24 hr capsule 0.4 mg, 0.4 mg, oral, Daily, Jacob Gonzalez MD, 0.4 mg at 01/31/24 0907    vancomycin-diluent combo no.1 (Xellia) IVPB 1,250 mg, 1,250 mg, intravenous, q12h, Molina Arita [facility], Stopped at 01/31/24 0909   Assessment/Plan   Principal Problem:    Frostbite of hand, left, initial encounter  Active Problems:    Frostbite, initial encounter  Hypertension  Hand pain  BPH  Neuropathy  History of drug abuse    Plan: Continue current medication.  Give supportive care.  Physical therapy.  Pain control.  Antibiotics per ID.  Advised to be compliant with the medication instructions and to quit drugs.  Possible discharge today or tomorrow on oral antibiotics.  Duration of antibiotics per ID.      Jacob Gonzalez MD

## 2024-01-31 NOTE — PROGRESS NOTES
Chris Toribio is a 53 y.o. male on day 1 of admission presenting with Frostbite of hand, left, initial encounter.    Subjective   Interval History:   Patient seen and examined  Mild to severe hand pain  No fever or chills  No chest pain or shortness of breath  No nausea vomiting or diarrhea        Review of Systems   All other systems reviewed and are negative.      Objective   Range of Vitals (last 24 hours)  Heart Rate:  [79-97]   Temp:  [36.3 °C (97.3 °F)-37.5 °C (99.5 °F)]   Resp:  [18-19]   BP: (112-146)/(67-88)   SpO2:  [89 %-97 %]   Daily Weight  01/28/24 : 119 kg (263 lb)    Body mass index is 35.67 kg/m².    Physical Exam  Constitutional:       Appearance: Normal appearance.   HENT:      Head: Normocephalic and atraumatic.      Nose: Nose normal.   Eyes:      Extraocular Movements: Extraocular movements intact.      Conjunctiva/sclera: Conjunctivae normal.   Cardiovascular:      Rate and Rhythm: Regular rhythm.      Heart sounds: Normal heart sounds.   Pulmonary:      Breath sounds: Normal breath sounds.   Abdominal:      General: Bowel sounds are normal.      Palpations: Abdomen is soft.   Musculoskeletal:      Cervical back: Normal range of motion and neck supple.   Skin:     Comments: Left thumb, index, and fifth finger distal phalanx necrotic digits   Neurological:      Mental Status: He is alert.     Antibiotics  cefepime (Maxipime) 1 g in dextrose 5 % 50 mL IV  diphth,pertus(acell),tetanus (BoostRIX) 2.5-8-5 Lf-mcg-Lf/0.5mL vaccine 0.5 mL    gabapentin (Neurontin) capsule  lisinopril (Prinivil, Zestril) tablet  tamsulosin (Flomax) 24 hr capsule  HYDROcodone-acetaminophen (Norco) 5-325 mg per tablet 1 tablet  buprenorphine-naloxone (Suboxone) 8-2 mg per SL film 1 Film  gabapentin (Neurontin) capsule 300 mg  lisinopril tablet 20 mg  tamsulosin (Flomax) 24 hr capsule 0.4 mg  acetaminophen (Tylenol) tablet 650 mg  acetaminophen (Tylenol) oral liquid 650 mg  acetaminophen (Tylenol) suppository 650  "mg  polyethylene glycol (Glycolax, Miralax) packet 17 g  benzocaine-menthol (Cepastat Sore Throat) 15-3.6 mg lozenge 1 lozenge  dextromethorphan-guaifenesin (Robitussin DM)  mg/5 mL oral liquid 5 mL  guaiFENesin (Mucinex) 12 hr tablet 600 mg  meropenem (Merrem) in sodium chloride 0.9 % 100 mL IV 1 g  nicotine (Nicoderm CQ) 21 mg/24 hr patch 1 patch  vancomycin-diluent combo no.1 (Xellia) IVPB 1,250 mg      Relevant Results  Labs  Results from last 72 hours   Lab Units 01/29/24  0551 01/28/24  2104   WBC AUTO x10*3/uL 8.6 13.0*   HEMOGLOBIN g/dL 15.5 16.2   HEMATOCRIT % 46.2 48.7   PLATELETS AUTO x10*3/uL 250 262   NEUTROS PCT AUTO %  --  87.8   LYMPHS PCT AUTO %  --  7.0   MONOS PCT AUTO %  --  4.1   EOS PCT AUTO %  --  0.3     Results from last 72 hours   Lab Units 01/29/24  0551 01/28/24  2104   SODIUM mmol/L 135 133   POTASSIUM mmol/L 4.2 4.4   CHLORIDE mmol/L 99 96*   CO2 mmol/L 24 26   BUN mg/dL 16 17   CREATININE mg/dL 0.70 0.80   GLUCOSE mg/dL 106* 105*   CALCIUM mg/dL 9.1 9.4   ANION GAP mmol/L 12 11   EGFR mL/min/1.73m*2 >90 >90     Results from last 72 hours   Lab Units 01/29/24  0551   ALK PHOS U/L 80   BILIRUBIN TOTAL mg/dL 0.5   PROTEIN TOTAL g/dL 7.1   ALT U/L 44*   AST U/L 76*   ALBUMIN g/dL 3.3*     Estimated Creatinine Clearance: 125 mL/min (by C-G formula based on SCr of 0.7 mg/dL).  No results found for: \"CRP\"  Microbiology  Reviewed  Imaging  XR hand left 3+ views    Result Date: 1/28/2024  STUDY: Hand Radiographs; 1/28/2024, 8:01PM INDICATION: Injury. COMPARISON: None Available. ACCESSION NUMBER(S): [id number] ORDERING CLINICIAN: JORJE C REE TECHNIQUE:  Four view(s) of the left hand. FINDINGS:  There is no detectable displaced fracture or dislocation. There is minimal swelling especially apparent in the dorsum of the hand.    Minimal swelling. No detectable displaced fracture. Remainder as above. Signed by Pa Garsia MD     Assessment/Plan   Left thumb, index finger and fifth finger " frostbite-has risk for infection  Allergy to penicillin-tolerating meropenem     Continue meropenem-empiric  IV vancomycin-empiric  Pain management.  Hand surgery consult  Monitor evolution of frostbite  Supportive care  Monitor temperature and WBC  Long-term plan is oral antibiotic-considering doxycycline and Levaquin therapy with follow-up with hand surgeon in office-2/11/2024         Robert Knapp MD

## 2024-02-01 LAB
ANION GAP SERPL CALC-SCNC: 11 MMOL/L
BUN SERPL-MCNC: 16 MG/DL (ref 8–25)
CALCIUM SERPL-MCNC: 9.2 MG/DL (ref 8.5–10.4)
CHLORIDE SERPL-SCNC: 97 MMOL/L (ref 97–107)
CO2 SERPL-SCNC: 22 MMOL/L (ref 24–31)
CREAT SERPL-MCNC: 0.7 MG/DL (ref 0.4–1.6)
EGFRCR SERPLBLD CKD-EPI 2021: >90 ML/MIN/1.73M*2
ERYTHROCYTE [DISTWIDTH] IN BLOOD BY AUTOMATED COUNT: 12.2 % (ref 11.5–14.5)
GLUCOSE SERPL-MCNC: 110 MG/DL (ref 65–99)
HCT VFR BLD AUTO: 44.6 % (ref 41–52)
HGB BLD-MCNC: 14.8 G/DL (ref 13.5–17.5)
MCH RBC QN AUTO: 33 PG (ref 26–34)
MCHC RBC AUTO-ENTMCNC: 33.2 G/DL (ref 32–36)
MCV RBC AUTO: 100 FL (ref 80–100)
NRBC BLD-RTO: 0 /100 WBCS (ref 0–0)
PLATELET # BLD AUTO: 193 X10*3/UL (ref 150–450)
POTASSIUM SERPL-SCNC: 4.6 MMOL/L (ref 3.4–5.1)
RBC # BLD AUTO: 4.48 X10*6/UL (ref 4.5–5.9)
SODIUM SERPL-SCNC: 130 MMOL/L (ref 133–145)
WBC # BLD AUTO: 6.5 X10*3/UL (ref 4.4–11.3)

## 2024-02-01 PROCEDURE — 36415 COLL VENOUS BLD VENIPUNCTURE: CPT | Performed by: NURSE PRACTITIONER

## 2024-02-01 PROCEDURE — 2500000002 HC RX 250 W HCPCS SELF ADMINISTERED DRUGS (ALT 637 FOR MEDICARE OP, ALT 636 FOR OP/ED): Performed by: INTERNAL MEDICINE

## 2024-02-01 PROCEDURE — 2500000004 HC RX 250 GENERAL PHARMACY W/ HCPCS (ALT 636 FOR OP/ED): Performed by: INTERNAL MEDICINE

## 2024-02-01 PROCEDURE — 85027 COMPLETE CBC AUTOMATED: CPT | Performed by: NURSE PRACTITIONER

## 2024-02-01 PROCEDURE — 2500000005 HC RX 250 GENERAL PHARMACY W/O HCPCS: Performed by: INTERNAL MEDICINE

## 2024-02-01 PROCEDURE — 1200000002 HC GENERAL ROOM WITH TELEMETRY DAILY

## 2024-02-01 PROCEDURE — 2500000004 HC RX 250 GENERAL PHARMACY W/ HCPCS (ALT 636 FOR OP/ED)

## 2024-02-01 PROCEDURE — 80048 BASIC METABOLIC PNL TOTAL CA: CPT | Performed by: NURSE PRACTITIONER

## 2024-02-01 PROCEDURE — S4991 NICOTINE PATCH NONLEGEND: HCPCS | Performed by: INTERNAL MEDICINE

## 2024-02-01 PROCEDURE — 2500000001 HC RX 250 WO HCPCS SELF ADMINISTERED DRUGS (ALT 637 FOR MEDICARE OP): Performed by: INTERNAL MEDICINE

## 2024-02-01 RX ORDER — ONDANSETRON 4 MG/1
4 TABLET, ORALLY DISINTEGRATING ORAL EVERY 8 HOURS PRN
Status: DISCONTINUED | OUTPATIENT
Start: 2024-02-01 | End: 2024-02-02 | Stop reason: HOSPADM

## 2024-02-01 RX ADMIN — MEROPENEM 1 G: 1 INJECTION, POWDER, FOR SOLUTION INTRAVENOUS at 05:02

## 2024-02-01 RX ADMIN — BUPRENORPHINE AND NALOXONE 1 FILM: 8; 2 FILM BUCCAL; SUBLINGUAL at 08:21

## 2024-02-01 RX ADMIN — TAMSULOSIN HYDROCHLORIDE 0.4 MG: 0.4 CAPSULE ORAL at 08:21

## 2024-02-01 RX ADMIN — MEROPENEM 1 G: 1 INJECTION, POWDER, FOR SOLUTION INTRAVENOUS at 13:57

## 2024-02-01 RX ADMIN — HYDROCODONE BITARTRATE AND ACETAMINOPHEN 1 TABLET: 5; 325 TABLET ORAL at 05:08

## 2024-02-01 RX ADMIN — HYDROCODONE BITARTRATE AND ACETAMINOPHEN 1 TABLET: 5; 325 TABLET ORAL at 09:32

## 2024-02-01 RX ADMIN — GABAPENTIN 300 MG: 300 CAPSULE ORAL at 21:04

## 2024-02-01 RX ADMIN — BUPRENORPHINE AND NALOXONE 1 FILM: 8; 2 FILM BUCCAL; SUBLINGUAL at 21:04

## 2024-02-01 RX ADMIN — GABAPENTIN 300 MG: 300 CAPSULE ORAL at 08:21

## 2024-02-01 RX ADMIN — GABAPENTIN 300 MG: 300 CAPSULE ORAL at 14:00

## 2024-02-01 RX ADMIN — BUPRENORPHINE AND NALOXONE 1 FILM: 8; 2 FILM BUCCAL; SUBLINGUAL at 14:00

## 2024-02-01 RX ADMIN — NICOTINE 1 PATCH: 21 PATCH, EXTENDED RELEASE TRANSDERMAL at 08:26

## 2024-02-01 RX ADMIN — HYDROCODONE BITARTRATE AND ACETAMINOPHEN 1 TABLET: 5; 325 TABLET ORAL at 21:04

## 2024-02-01 RX ADMIN — MEROPENEM 1 G: 1 INJECTION, POWDER, FOR SOLUTION INTRAVENOUS at 21:04

## 2024-02-01 RX ADMIN — VANCOMYCIN 1250 MG: 1.75 INJECTION, SOLUTION INTRAVENOUS at 08:21

## 2024-02-01 RX ADMIN — ONDANSETRON 4 MG: 4 TABLET, ORALLY DISINTEGRATING ORAL at 15:16

## 2024-02-01 RX ADMIN — ONDANSETRON 4 MG: 4 TABLET, ORALLY DISINTEGRATING ORAL at 01:45

## 2024-02-01 RX ADMIN — HYDROCODONE BITARTRATE AND ACETAMINOPHEN 1 TABLET: 5; 325 TABLET ORAL at 17:34

## 2024-02-01 RX ADMIN — LISINOPRIL 20 MG: 20 TABLET ORAL at 08:21

## 2024-02-01 RX ADMIN — HYDROCODONE BITARTRATE AND ACETAMINOPHEN 1 TABLET: 5; 325 TABLET ORAL at 13:57

## 2024-02-01 RX ADMIN — VANCOMYCIN 1250 MG: 1.75 INJECTION, SOLUTION INTRAVENOUS at 21:07

## 2024-02-01 RX ADMIN — HYDROCODONE BITARTRATE AND ACETAMINOPHEN 1 TABLET: 5; 325 TABLET ORAL at 00:29

## 2024-02-01 ASSESSMENT — COGNITIVE AND FUNCTIONAL STATUS - GENERAL
DAILY ACTIVITIY SCORE: 21
DRESSING REGULAR UPPER BODY CLOTHING: A LITTLE
MOBILITY SCORE: 23
HELP NEEDED FOR BATHING: A LITTLE
CLIMB 3 TO 5 STEPS WITH RAILING: A LITTLE
DRESSING REGULAR LOWER BODY CLOTHING: A LITTLE

## 2024-02-01 ASSESSMENT — PAIN SCALES - GENERAL
PAINLEVEL_OUTOF10: 9
PAINLEVEL_OUTOF10: 0 - NO PAIN
PAINLEVEL_OUTOF10: 5 - MODERATE PAIN
PAINLEVEL_OUTOF10: 6
PAINLEVEL_OUTOF10: 3
PAINLEVEL_OUTOF10: 10 - WORST POSSIBLE PAIN
PAINLEVEL_OUTOF10: 10 - WORST POSSIBLE PAIN

## 2024-02-01 ASSESSMENT — PAIN - FUNCTIONAL ASSESSMENT
PAIN_FUNCTIONAL_ASSESSMENT: 0-10
PAIN_FUNCTIONAL_ASSESSMENT: FLACC (FACE, LEGS, ACTIVITY, CRY, CONSOLABILITY)
PAIN_FUNCTIONAL_ASSESSMENT: FLACC (FACE, LEGS, ACTIVITY, CRY, CONSOLABILITY)
PAIN_FUNCTIONAL_ASSESSMENT: 0-10
PAIN_FUNCTIONAL_ASSESSMENT: 0-10

## 2024-02-01 ASSESSMENT — PAIN DESCRIPTION - DESCRIPTORS
DESCRIPTORS: ACHING
DESCRIPTORS: ACHING;SQUEEZING
DESCRIPTORS: THROBBING

## 2024-02-01 ASSESSMENT — PAIN DESCRIPTION - ORIENTATION: ORIENTATION: LEFT

## 2024-02-01 ASSESSMENT — PAIN DESCRIPTION - LOCATION: LOCATION: HAND

## 2024-02-01 NOTE — NURSING NOTE
Assumed care of pt.BSSR received from previous nurse.Pt observed sitting up in bed,A&O x 3.Respiration regular and unlabored on room air. Heart monitor on. Plan of care discussed. No concerns or needs voiced at this time.Call light within reach.Bed in lowest position,locked.Will continue current plan of care and update as necessary.

## 2024-02-01 NOTE — PROGRESS NOTES
Chris Toribio is a 53 y.o. male on day 2 of admission presenting with Frostbite of hand, left, initial encounter.    Subjective   Patient seen and examined.  Resting in bed in no acute distress.  Awake alert oriented x 3.  Complains of worsening left thumb discoloration, swelling and pain.  Left middle and 5th finger pain.  Nausea, resolved with Zofran, no vomiting.  Nausea resolved.  Tolerating diet.    Objective     Physical Exam  Vitals reviewed.   Constitutional:       General: He is not in acute distress.     Appearance: Normal appearance. He is obese. He is not ill-appearing or toxic-appearing.   HENT:      Head: Normocephalic and atraumatic.      Right Ear: Tympanic membrane normal.      Left Ear: Tympanic membrane normal.      Nose: Nose normal.      Mouth/Throat:      Mouth: Mucous membranes are moist.      Pharynx: Oropharynx is clear.   Eyes:      Extraocular Movements: Extraocular movements intact.      Conjunctiva/sclera: Conjunctivae normal.      Pupils: Pupils are equal, round, and reactive to light.   Cardiovascular:      Rate and Rhythm: Normal rate and regular rhythm.      Pulses: Normal pulses.      Heart sounds: Normal heart sounds.   Pulmonary:      Effort: Pulmonary effort is normal. No respiratory distress.      Breath sounds: Normal breath sounds. No wheezing, rhonchi or rales.      Comments: Room air  Abdominal:      General: Bowel sounds are normal.      Palpations: Abdomen is soft.   Genitourinary:     Comments: Deferred  Musculoskeletal:         General: Swelling and tenderness present. Normal range of motion.      Cervical back: Normal range of motion and neck supple.   Skin:     General: Skin is warm and dry.      Capillary Refill: Capillary refill takes less than 2 seconds.      Comments: Left thumb discoloration, swelling, tenderness.  Left index finger necrosis.  Left middle finger discoloration, swelling, tenderness.  Left 5th finger discoloration, swelling, tenderness    Neurological:      General: No focal deficit present.      Mental Status: He is alert and oriented to person, place, and time.   Psychiatric:         Mood and Affect: Mood normal.         Behavior: Behavior normal.       Last Recorded Vitals  Blood pressure 122/79, pulse 84, temperature 36.5 °C (97.7 °F), temperature source Oral, resp. rate 18, height 1.829 m (6'), weight 119 kg (263 lb), SpO2 94 %.    Intake/Output last 3 Shifts:  I/O last 3 completed shifts:  In: 775 (6.5 mL/kg) [P.O.:675; IV Piggyback:100]  Out: - (0 mL/kg)   Weight: 119.3 kg     Telemetry normal sinus rhythm rate 80's    Relevant Results  Results for orders placed or performed during the hospital encounter of 01/28/24 (from the past 24 hour(s))   Basic Metabolic Panel   Result Value Ref Range    Glucose 110 (H) 65 - 99 mg/dL    Sodium 130 (L) 133 - 145 mmol/L    Potassium 4.6 3.4 - 5.1 mmol/L    Chloride 97 97 - 107 mmol/L    Bicarbonate 22 (L) 24 - 31 mmol/L    Urea Nitrogen 16 8 - 25 mg/dL    Creatinine 0.70 0.40 - 1.60 mg/dL    eGFR >90 >60 mL/min/1.73m*2    Calcium 9.2 8.5 - 10.4 mg/dL    Anion Gap 11 <=19 mmol/L   CBC   Result Value Ref Range    WBC 6.5 4.4 - 11.3 x10*3/uL    nRBC 0.0 0.0 - 0.0 /100 WBCs    RBC 4.48 (L) 4.50 - 5.90 x10*6/uL    Hemoglobin 14.8 13.5 - 17.5 g/dL    Hematocrit 44.6 41.0 - 52.0 %     80 - 100 fL    MCH 33.0 26.0 - 34.0 pg    MCHC 33.2 32.0 - 36.0 g/dL    RDW 12.2 11.5 - 14.5 %    Platelets 193 150 - 450 x10*3/uL     No results found.    Scheduled medications  buprenorphine-naloxone, 1 Film, sublingual, TID  gabapentin, 300 mg, oral, TID  lisinopril, 20 mg, oral, Daily  meropenem, 1 g, intravenous, q8h  nicotine, 1 patch, transdermal, Daily  tamsulosin, 0.4 mg, oral, Daily  vancomycin-diluent combo no.1, 1,250 mg, intravenous, q12h      Continuous medications     PRN medications  PRN medications: acetaminophen **OR** acetaminophen **OR** acetaminophen, benzocaine-menthol, dextromethorphan-guaifenesin,  guaiFENesin, HYDROcodone-acetaminophen, ondansetron ODT, polyethylene glycol    ASSESSMENT:  Left hand frostbite  Neuropathy  Hypertension  BPH  Drug abuse    PLAN:  Symptoms persist.  Increased left thumb discoloration, swelling, pain.  Infectious disease, Orthopedic surgery input appreciated.  IV antibiotics Vancomycin, Meropenem.  Long term plan oral antibiotics Doxycycline and Levaquin with outpatient follow up with Orthopedic surgery.  Monitor temperature and white blood cell count.  Local care.  Betadine over necrotic skin once daily.  Orthopedic surgery follow up in 2 weeks, potential amputation pending recovery.  P.r.n analgesics.  PT/OT.  Fall precautions.  Up with assistance.  Supportive care.  Patient reassured.  Case management following for discharge planning.  Discharge plan home.  Monitor.  Discussed with Dr. Gonzalez.    JAMARCUS Rose-CNP

## 2024-02-01 NOTE — CARE PLAN
Problem: Pain  Goal: Takes deep breaths with improved pain control throughout the shift  Outcome: Progressing  Goal: Turns in bed with improved pain control throughout the shift  Outcome: Progressing  Goal: Walks with improved pain control throughout the shift  Outcome: Progressing  Goal: Performs ADL's with improved pain control throughout shift  Outcome: Progressing  Goal: Participates in PT with improved pain control throughout the shift  Outcome: Progressing  Goal: Free from opioid side effects throughout the shift  Outcome: Progressing  Goal: Free from acute confusion related to pain meds throughout the shift  Outcome: Progressing     Problem: Pain - Adult  Goal: Verbalizes/displays adequate comfort level or baseline comfort level  Outcome: Progressing     Problem: Safety - Adult  Goal: Free from fall injury  Outcome: Progressing     Problem: Discharge Planning  Goal: Discharge to home or other facility with appropriate resources  Outcome: Progressing     Problem: Chronic Conditions and Co-morbidities  Goal: Patient's chronic conditions and co-morbidity symptoms are monitored and maintained or improved  Outcome: Progressing     Problem: Nutrition  Goal: Promote healing  Outcome: Progressing  Goal: Maintain stable weight  Outcome: Progressing  Goal: Reduce weight from edema/fluid  Outcome: Progressing  Goal: Gradual weight gain  Outcome: Progressing     Problem: Skin  Goal: Decreased wound size/increased tissue granulation at next dressing change  Outcome: Progressing  Flowsheets (Taken 1/31/2024 2308)  Decreased wound size/increased tissue granulation at next dressing change:   Promote sleep for wound healing   Protective dressings over bony prominences   Utilize specialty bed per algorithm  Goal: Participates in plan/prevention/treatment measures  Outcome: Progressing  Flowsheets (Taken 1/31/2024 2308)  Participates in plan/prevention/treatment measures:   Discuss with provider PT/OT consult   Elevate  heels   Increase activity/out of bed for meals  Goal: Prevent/manage excess moisture  Outcome: Progressing  Flowsheets (Taken 1/31/2024 2308)  Prevent/manage excess moisture:   Cleanse incontinence/protect with barrier cream   Follow provider orders for dressing changes   Moisturize dry skin   Monitor for/manage infection if present   Use wicking fabric (obtain order)  Goal: Prevent/minimize sheer/friction injuries  Outcome: Progressing  Flowsheets (Taken 1/31/2024 2308)  Prevent/minimize sheer/friction injuries:   Complete micro-shifts as needed if patient unable. Adjust patient position to relieve pressure points, not a full turn   HOB 30 degrees or less   Increase activity/out of bed for meals   Turn/reposition every 2 hours/use positioning/transfer devices   Use pull sheet   Utilize specialty bed per algorithm  Goal: Promote/optimize nutrition  Outcome: Progressing  Flowsheets (Taken 1/31/2024 2308)  Promote/optimize nutrition:   Assist with feeding   Consume > 50% meals/supplements   Discuss with provider if NPO > 2 days   Monitor/record intake including meals   Offer water/supplements/favorite foods   Reassess MST if dietician not consulted  Goal: Promote skin healing  Outcome: Progressing  Flowsheets (Taken 1/31/2024 2308)  Promote skin healing:   Assess skin/pad under line(s)/device(s)   Ensure correct size (line/device) and apply per  instructions   Rotate device position/do not position patient on device   Protective dressings over bony prominences   Turn/reposition every 2 hours/use positioning/transfer devices   The patient's goals for the shift include      The clinical goals for the shift include pan management    Over the shift, the patient did not make progress toward the following goals. Barriers to progression include . Recommendations to address these barriers include .

## 2024-02-01 NOTE — PROGRESS NOTES
Chris Toribio is a 53 y.o. male on day 2 of admission presenting with Frostbite of hand, left, initial encounter.    Subjective   Interval History:   Reports Mild to severe left hand pain  Afebrile, reports chills  Denies chest pain or shortness of breath  Reported nausea, resolved after zofran  No vomiting or diarrhea            Objective   Range of Vitals (last 24 hours)  Heart Rate:  [80-91]   Temp:  [36.4 °C (97.5 °F)-37.1 °C (98.8 °F)]   Resp:  [18-19]   BP: (120-136)/(63-79)   SpO2:  [94 %-95 %]   Daily Weight  01/28/24 : 119 kg (263 lb)    Body mass index is 35.67 kg/m².    Physical Exam  Constitutional:       Appearance: Normal appearance.   HENT:      Head: Normocephalic and atraumatic.      Nose: Nose normal.   Eyes:      Extraocular Movements: Extraocular movements intact.      Conjunctiva/sclera: Conjunctivae normal.   Cardiovascular:      Rate and Rhythm: Regular rhythm.      Heart sounds: Normal heart sounds.   Pulmonary:      Breath sounds: Normal breath sounds.   Abdominal:      General: Bowel sounds are normal.      Palpations: Abdomen is soft.   Musculoskeletal:      Cervical back: Normal range of motion and neck supple.   Skin:     Comments: Left thumb, index, and fifth finger distal phalanx necrotic digits   Neurological:      Mental Status: He is alert.     Antibiotics  cefepime (Maxipime) 1 g in dextrose 5 % 50 mL IV  diphth,pertus(acell),tetanus (BoostRIX) 2.5-8-5 Lf-mcg-Lf/0.5mL vaccine 0.5 mL    gabapentin (Neurontin) capsule  lisinopril (Prinivil, Zestril) tablet  tamsulosin (Flomax) 24 hr capsule  HYDROcodone-acetaminophen (Norco) 5-325 mg per tablet 1 tablet  buprenorphine-naloxone (Suboxone) 8-2 mg per SL film 1 Film  gabapentin (Neurontin) capsule 300 mg  lisinopril tablet 20 mg  tamsulosin (Flomax) 24 hr capsule 0.4 mg  acetaminophen (Tylenol) tablet 650 mg  acetaminophen (Tylenol) oral liquid 650 mg  acetaminophen (Tylenol) suppository 650 mg  polyethylene glycol (Glycolax,  "Miralax) packet 17 g  benzocaine-menthol (Cepastat Sore Throat) 15-3.6 mg lozenge 1 lozenge  dextromethorphan-guaifenesin (Robitussin DM)  mg/5 mL oral liquid 5 mL  guaiFENesin (Mucinex) 12 hr tablet 600 mg  meropenem (Merrem) in sodium chloride 0.9 % 100 mL IV 1 g  nicotine (Nicoderm CQ) 21 mg/24 hr patch 1 patch  vancomycin-diluent combo no.1 (Xellia) IVPB 1,250 mg      Relevant Results  Labs  Results from last 72 hours   Lab Units 02/01/24  0829   WBC AUTO x10*3/uL 6.5   HEMOGLOBIN g/dL 14.8   HEMATOCRIT % 44.6   PLATELETS AUTO x10*3/uL 193       Results from last 72 hours   Lab Units 02/01/24  0829   SODIUM mmol/L 130*   POTASSIUM mmol/L 4.6   CHLORIDE mmol/L 97   CO2 mmol/L 22*   BUN mg/dL 16   CREATININE mg/dL 0.70   GLUCOSE mg/dL 110*   CALCIUM mg/dL 9.2   ANION GAP mmol/L 11   EGFR mL/min/1.73m*2 >90             Estimated Creatinine Clearance: 125 mL/min (by C-G formula based on SCr of 0.7 mg/dL).  No results found for: \"CRP\"  Microbiology  Reviewed  Imaging  XR hand left 3+ views    Result Date: 1/28/2024  STUDY: Hand Radiographs; 1/28/2024, 8:01PM INDICATION: Injury. COMPARISON: None Available. ACCESSION NUMBER(S): [id number] ORDERING CLINICIAN: JORJE KEENAN TECHNIQUE:  Four view(s) of the left hand. FINDINGS:  There is no detectable displaced fracture or dislocation. There is minimal swelling especially apparent in the dorsum of the hand.    Minimal swelling. No detectable displaced fracture. Remainder as above. Signed by Pa Garsia MD     Assessment/Plan   Left thumb, index finger and fifth finger frostbite-has risk for infection  Allergy to penicillin-tolerating meropenem       Continue meropenem-empiric  IV vancomycin-empiric  Pain management.  Hand surgery consult  Monitor evolution of frostbite  Supportive care  Monitor temperature and WBC  Long-term plan is oral antibiotic-considering doxycycline and Levaquin therapy with follow-up with hand surgeon in office-2/11/2024         Verna MCCARTHY" Adeel, APRN-CNP

## 2024-02-01 NOTE — PROGRESS NOTES
Nutrition Follow up Note    Nutrition Assessment      Possible discharge to home on oral antibiotics today. Scheduled outpatient follow-up w/hand surgeon 2/11/24 for possible index finger amputation. PO intake good, no nutritional concerns at this time.    Nutrition History:     Energy Intake: Good > 75 %  Food Allergies/Intolerances:  None  GI Symptoms: None  Oral Problems: None    Anthropometrics:  Ht: 182.9 cm (6'), Wt: 119 kg (263 lb), BMI: 35.66  IBW/kg (Dietitian Calculated): 80.91 kg  Percent of IBW: 147.08 %  Adjusted Body Weight (kg): 90.45 kg    Weight Change:  Daily Weight  01/28/24 : 119 kg (263 lb)     Nutrition Focused Physical Exam Findings:      Nutrition Significant Labs:  Lab Results   Component Value Date    WBC 6.5 02/01/2024    HGB 14.8 02/01/2024    HCT 44.6 02/01/2024     02/01/2024    ALT 44 (H) 01/29/2024    AST 76 (H) 01/29/2024     (L) 02/01/2024    K 4.6 02/01/2024    CL 97 02/01/2024    CREATININE 0.70 02/01/2024    BUN 16 02/01/2024    CO2 22 (L) 02/01/2024    TSH 0.55 02/10/2023       Current Facility-Administered Medications:     acetaminophen (Tylenol) tablet 650 mg, 650 mg, oral, q4h PRN **OR** acetaminophen (Tylenol) oral liquid 650 mg, 650 mg, oral, q4h PRN **OR** acetaminophen (Tylenol) suppository 650 mg, 650 mg, rectal, q4h PRN, Jacob Gonzalez MD    benzocaine-menthol (Cepastat Sore Throat) 15-3.6 mg lozenge 1 lozenge, 1 lozenge, Mouth/Throat, q2h PRN, Jacob Gonzalez MD    buprenorphine-naloxone (Suboxone) 8-2 mg per SL film 1 Film, 1 Film, sublingual, TID, Jacob Gonzalez MD, 1 Film at 02/01/24 0821    dextromethorphan-guaifenesin (Robitussin DM)  mg/5 mL oral liquid 5 mL, 5 mL, oral, q4h PRN, Jacob Gonzalez MD    gabapentin (Neurontin) capsule 300 mg, 300 mg, oral, TID, Jacob Gonzalez MD, 300 mg at 02/01/24 0821    guaiFENesin (Mucinex) 12 hr tablet 600 mg, 600 mg, oral, q12h PRN, Jacob Gonzalez MD    HYDROcodone-acetaminophen  (Norco) 5-325 mg per tablet 1 tablet, 1 tablet, oral, q4h PRN, Jacob Gonzalez MD, 1 tablet at 02/01/24 0932    lisinopril tablet 20 mg, 20 mg, oral, Daily, Jacob Gonzalez MD, 20 mg at 02/01/24 0821    meropenem (Merrem) in sodium chloride 0.9 % 100 mL IV 1 g, 1 g, intravenous, q8h, Jacob Gonzalez MD, Stopped at 02/01/24 0532    nicotine (Nicoderm CQ) 21 mg/24 hr patch 1 patch, 1 patch, transdermal, Daily, Jacob Gonzalez MD, 1 patch at 02/01/24 0826    ondansetron ODT (Zofran-ODT) disintegrating tablet 4 mg, 4 mg, oral, q8h PRN, Jacob Gonzalez MD, 4 mg at 02/01/24 0145    polyethylene glycol (Glycolax, Miralax) packet 17 g, 17 g, oral, Daily PRN, Jacob Gonzalez MD    tamsulosin (Flomax) 24 hr capsule 0.4 mg, 0.4 mg, oral, Daily, Jacob Gonzalez MD, 0.4 mg at 02/01/24 0821    vancomycin-diluent combo no.1 (Xellia) IVPB 1,250 mg, 1,250 mg, intravenous, q12h, Molina Arita, [facility], Stopped at 02/01/24 0936    Dietary Orders (From admission, onward)       Start     Ordered    01/29/24 1534  Oral nutritional supplements  Until discontinued        Comments: Any flavor   Question Answer Comment   Deliver with Breakfast    Deliver with Dinner    Select supplement: Tanner        01/29/24 1533    01/29/24 0207  May Participate in Room Service  Once        Question:  .  Answer:  Yes    01/29/24 0207    01/29/24 0050  Adult diet Regular  Diet effective now        Question:  Diet type  Answer:  Regular    01/29/24 0049                   Estimated Needs:   Estimated Energy Needs  Total Energy Estimated Needs (kCal):  (0613-1946)  Total Estimated Energy Need per Day (kCal/kg):  (25-28)  Method for Estimating Needs: ABW    Estimated Protein Needs  Total Protein Estimated Needs (g):  ()  Total Protein Estimated Needs (g/kg):  (1-1.2)  Method for Estimating Needs: ABW    Estimated Fluid Needs  Total Fluid Estimated Needs (mL):  (4000-9465)  Method for Estimating Needs: 1 mL/kcal      Nutrition  Diagnosis   Nutrition Diagnosis:     Nutrition Diagnosis  Patient has Nutrition Diagnosis: No     Nutrition Interventions/Recommendations   Nutrition Interventions and Recommendations:    Nutrition Prescription:  Individualized Nutrition Prescription Provided for : 9723-7820 kcals,  gm protein via diet    Nutrition Interventions:   Food and/or Nutrient Delivery Interventions  Interventions: Meals and snacks  Meals and Snacks: General healthful diet  Goal: Provide diet as ordered  Medical Food Supplement: Commercial beverage  Goal: Tanner BID to aid in wound healing. Tanner provides an additional 90 kcals, 2.5 gm protein per serving.    Education Documentation  No documentation found.         Nutrition Monitoring and Evaluation   Monitoring/Evaluation:   Food/Nutrient Related History Monitoring  Monitoring and Evaluation Plan: Energy intake  Energy Intake: Estimated energy intake  Criteria: Pt to consume >/= 75% of estimated needs       Time Spent/Follow-up:   Follow Up  Time Spent (min): 20 minutes  Last Date of Nutrition Visit: 02/01/24  Nutrition Follow-Up Needed?: 7-10 days  Follow up Comment: 2/8/24

## 2024-02-01 NOTE — PROGRESS NOTES
DC plan is for pt to return home on oral antibiotics. No skilled needs identified.    Safe dc plan for pt to return home no skilled needs-no needs from [facility] pt can go when dc.    Victoria TEIXEIRAA, LSW

## 2024-02-01 NOTE — PROGRESS NOTES
Chris Toribio is a 53 y.o. male on day 2 of admission presenting with Frostbite of hand, left, initial encounter.      Subjective   Patient seen and examined.  Comfortable lying in the bed.  Denies any headache or dizziness.       Objective     Last Recorded Vitals  /63 (BP Location: Right arm, Patient Position: Lying)   Pulse 91   Temp 37.1 °C (98.8 °F) (Oral)   Resp 19   Wt 119 kg (263 lb)   SpO2 94%   Intake/Output last 3 Shifts:    Intake/Output Summary (Last 24 hours) at 2/1/2024 0109  Last data filed at 1/31/2024 1300  Gross per 24 hour   Intake 775 ml   Output --   Net 775 ml       Admission Weight  Weight: 119 kg (263 lb) (01/28/24 1943)    Daily Weight  01/28/24 : 119 kg (263 lb)    Image Results  XR hand left 3+ views  Narrative: STUDY:  Hand Radiographs; 1/28/2024, 8:01PM  INDICATION:  Injury.  COMPARISON:  None Available.  ACCESSION NUMBER(S):  [id number]  ORDERING CLINICIAN:  JORJE KEENAN  TECHNIQUE:  Four view(s) of the left hand.  FINDINGS:    There is no detectable displaced fracture or dislocation. There is  minimal swelling especially apparent in the dorsum of the hand.  Impression: Minimal swelling. No detectable displaced fracture. Remainder as  above.  Signed by Pa Garsia MD      Physical Exam    Relevant Results               Assessment/Plan                  Principal Problem:    Frostbite of hand, left, initial encounter  Active Problems:    Frostbite, initial encounter    Continue current medication.  Pain control.  Supportive care.              Jacob Gonzalez MD

## 2024-02-02 ENCOUNTER — PHARMACY VISIT (OUTPATIENT)
Dept: PHARMACY | Facility: CLINIC | Age: 54
End: 2024-02-02
Payer: MEDICAID

## 2024-02-02 VITALS
HEIGHT: 72 IN | TEMPERATURE: 98.4 F | WEIGHT: 263 LBS | BODY MASS INDEX: 35.62 KG/M2 | OXYGEN SATURATION: 91 % | SYSTOLIC BLOOD PRESSURE: 116 MMHG | HEART RATE: 91 BPM | DIASTOLIC BLOOD PRESSURE: 78 MMHG | RESPIRATION RATE: 17 BRPM

## 2024-02-02 LAB
ANION GAP SERPL CALC-SCNC: 10 MMOL/L
BUN SERPL-MCNC: 19 MG/DL (ref 8–25)
CALCIUM SERPL-MCNC: 8.8 MG/DL (ref 8.5–10.4)
CHLORIDE SERPL-SCNC: 99 MMOL/L (ref 97–107)
CO2 SERPL-SCNC: 23 MMOL/L (ref 24–31)
CREAT SERPL-MCNC: 0.8 MG/DL (ref 0.4–1.6)
EGFRCR SERPLBLD CKD-EPI 2021: >90 ML/MIN/1.73M*2
ERYTHROCYTE [DISTWIDTH] IN BLOOD BY AUTOMATED COUNT: 11.9 % (ref 11.5–14.5)
GLUCOSE SERPL-MCNC: 116 MG/DL (ref 65–99)
HCT VFR BLD AUTO: 41.4 % (ref 41–52)
HGB BLD-MCNC: 14.2 G/DL (ref 13.5–17.5)
MCH RBC QN AUTO: 32.8 PG (ref 26–34)
MCHC RBC AUTO-ENTMCNC: 34.3 G/DL (ref 32–36)
MCV RBC AUTO: 96 FL (ref 80–100)
NRBC BLD-RTO: 0 /100 WBCS (ref 0–0)
PLATELET # BLD AUTO: 186 X10*3/UL (ref 150–450)
POTASSIUM SERPL-SCNC: 4.5 MMOL/L (ref 3.4–5.1)
RBC # BLD AUTO: 4.33 X10*6/UL (ref 4.5–5.9)
SODIUM SERPL-SCNC: 132 MMOL/L (ref 133–145)
WBC # BLD AUTO: 7 X10*3/UL (ref 4.4–11.3)

## 2024-02-02 PROCEDURE — 36415 COLL VENOUS BLD VENIPUNCTURE: CPT | Performed by: NURSE PRACTITIONER

## 2024-02-02 PROCEDURE — S4991 NICOTINE PATCH NONLEGEND: HCPCS | Performed by: INTERNAL MEDICINE

## 2024-02-02 PROCEDURE — 2500000002 HC RX 250 W HCPCS SELF ADMINISTERED DRUGS (ALT 637 FOR MEDICARE OP, ALT 636 FOR OP/ED): Performed by: INTERNAL MEDICINE

## 2024-02-02 PROCEDURE — 85027 COMPLETE CBC AUTOMATED: CPT | Performed by: NURSE PRACTITIONER

## 2024-02-02 PROCEDURE — 2500000001 HC RX 250 WO HCPCS SELF ADMINISTERED DRUGS (ALT 637 FOR MEDICARE OP): Performed by: NURSE PRACTITIONER

## 2024-02-02 PROCEDURE — 2500000004 HC RX 250 GENERAL PHARMACY W/ HCPCS (ALT 636 FOR OP/ED): Performed by: INTERNAL MEDICINE

## 2024-02-02 PROCEDURE — 80048 BASIC METABOLIC PNL TOTAL CA: CPT | Performed by: NURSE PRACTITIONER

## 2024-02-02 PROCEDURE — RXMED WILLOW AMBULATORY MEDICATION CHARGE

## 2024-02-02 PROCEDURE — 2500000004 HC RX 250 GENERAL PHARMACY W/ HCPCS (ALT 636 FOR OP/ED)

## 2024-02-02 PROCEDURE — 2500000001 HC RX 250 WO HCPCS SELF ADMINISTERED DRUGS (ALT 637 FOR MEDICARE OP): Performed by: INTERNAL MEDICINE

## 2024-02-02 RX ORDER — IBUPROFEN 200 MG
1 TABLET ORAL DAILY
Qty: 28 PATCH | Refills: 0 | Status: SHIPPED | OUTPATIENT
Start: 2024-02-03

## 2024-02-02 RX ORDER — LEVOFLOXACIN 750 MG/1
750 TABLET ORAL DAILY
Qty: 10 TABLET | Refills: 0 | Status: SHIPPED | OUTPATIENT
Start: 2024-02-02 | End: 2024-02-12

## 2024-02-02 RX ORDER — POVIDONE-IODINE 10 %
1 SOLUTION, NON-ORAL TOPICAL DAILY
Qty: 236 ML | Refills: 0 | Status: SHIPPED | OUTPATIENT
Start: 2024-02-02

## 2024-02-02 RX ORDER — POVIDONE-IODINE 10 MG/G
1 OINTMENT TOPICAL DAILY
Status: DISCONTINUED | OUTPATIENT
Start: 2024-02-02 | End: 2024-02-02

## 2024-02-02 RX ORDER — DOXYCYCLINE 100 MG/1
100 CAPSULE ORAL 2 TIMES DAILY
Qty: 20 CAPSULE | Refills: 0 | Status: SHIPPED | OUTPATIENT
Start: 2024-02-02 | End: 2024-02-12

## 2024-02-02 RX ORDER — ACETAMINOPHEN 325 MG/1
650 TABLET ORAL EVERY 4 HOURS PRN
Start: 2024-02-02

## 2024-02-02 RX ADMIN — MEROPENEM 1 G: 1 INJECTION, POWDER, FOR SOLUTION INTRAVENOUS at 04:32

## 2024-02-02 RX ADMIN — HYDROCODONE BITARTRATE AND ACETAMINOPHEN 1 TABLET: 5; 325 TABLET ORAL at 01:03

## 2024-02-02 RX ADMIN — VANCOMYCIN 1250 MG: 1.75 INJECTION, SOLUTION INTRAVENOUS at 08:00

## 2024-02-02 RX ADMIN — LISINOPRIL 20 MG: 20 TABLET ORAL at 08:17

## 2024-02-02 RX ADMIN — NICOTINE 1 PATCH: 21 PATCH, EXTENDED RELEASE TRANSDERMAL at 08:17

## 2024-02-02 RX ADMIN — GABAPENTIN 300 MG: 300 CAPSULE ORAL at 08:17

## 2024-02-02 RX ADMIN — HYDROCODONE BITARTRATE AND ACETAMINOPHEN 1 TABLET: 5; 325 TABLET ORAL at 04:32

## 2024-02-02 RX ADMIN — MEROPENEM 1 G: 1 INJECTION, POWDER, FOR SOLUTION INTRAVENOUS at 12:50

## 2024-02-02 RX ADMIN — HYDROCODONE BITARTRATE AND ACETAMINOPHEN 1 TABLET: 5; 325 TABLET ORAL at 12:51

## 2024-02-02 RX ADMIN — POVIDONE-IODINE 1 APPLICATION: 100 OINTMENT TOPICAL at 13:15

## 2024-02-02 RX ADMIN — HYDROCODONE BITARTRATE AND ACETAMINOPHEN 1 TABLET: 5; 325 TABLET ORAL at 08:17

## 2024-02-02 RX ADMIN — TAMSULOSIN HYDROCHLORIDE 0.4 MG: 0.4 CAPSULE ORAL at 08:17

## 2024-02-02 RX ADMIN — BUPRENORPHINE AND NALOXONE 1 FILM: 8; 2 FILM BUCCAL; SUBLINGUAL at 08:17

## 2024-02-02 ASSESSMENT — PAIN DESCRIPTION - ORIENTATION
ORIENTATION: LEFT
ORIENTATION: LEFT

## 2024-02-02 ASSESSMENT — PAIN SCALES - WONG BAKER: WONGBAKER_NUMERICALRESPONSE: HURTS WHOLE LOT

## 2024-02-02 ASSESSMENT — PAIN SCALES - GENERAL
PAINLEVEL_OUTOF10: 8
PAINLEVEL_OUTOF10: 6
PAINLEVEL_OUTOF10: 7
PAINLEVEL_OUTOF10: 0 - NO PAIN
PAINLEVEL_OUTOF10: 0 - NO PAIN
PAINLEVEL_OUTOF10: 8

## 2024-02-02 ASSESSMENT — PAIN - FUNCTIONAL ASSESSMENT
PAIN_FUNCTIONAL_ASSESSMENT: FLACC (FACE, LEGS, ACTIVITY, CRY, CONSOLABILITY)
PAIN_FUNCTIONAL_ASSESSMENT: FLACC (FACE, LEGS, ACTIVITY, CRY, CONSOLABILITY)
PAIN_FUNCTIONAL_ASSESSMENT: 0-10
PAIN_FUNCTIONAL_ASSESSMENT: 0-10

## 2024-02-02 ASSESSMENT — PAIN DESCRIPTION - LOCATION
LOCATION: HAND
LOCATION: HAND

## 2024-02-02 NOTE — NURSING NOTE
Assumed care of pt.  Pt sitting in bed, respirations regular and unlabored.  Left finger swollen and elevated by patient.  Pt request his pain med to be given when due.  Bed locked and in low position, call light within reach and all safety maintained.

## 2024-02-02 NOTE — PROGRESS NOTES
Chris Toribio is a 53 y.o. male on day 3 of admission presenting with Frostbite of hand, left, initial encounter.    Subjective   Patient seen and examined.  Resting in bed in no acute distress.  Awake alert oriented x 3.  No new complaints.  Left hand thumb and 5th finger discoloration, pain radiating unchanged.    Objective     Physical Exam  Vitals reviewed.   Constitutional:       General: He is not in acute distress.     Appearance: Normal appearance. He is obese. He is not ill-appearing or toxic-appearing.   HENT:      Head: Normocephalic and atraumatic.      Right Ear: Tympanic membrane normal.      Left Ear: Tympanic membrane normal.      Nose: Nose normal.      Mouth/Throat:      Mouth: Mucous membranes are moist.      Pharynx: Oropharynx is clear.   Eyes:      Extraocular Movements: Extraocular movements intact.      Conjunctiva/sclera: Conjunctivae normal.      Pupils: Pupils are equal, round, and reactive to light.   Cardiovascular:      Rate and Rhythm: Normal rate and regular rhythm.      Pulses: Normal pulses.      Heart sounds: Normal heart sounds.   Pulmonary:      Effort: Pulmonary effort is normal. No respiratory distress.      Breath sounds: Normal breath sounds. No wheezing, rhonchi or rales.      Comments: Room air  Abdominal:      General: Bowel sounds are normal. There is no distension.      Palpations: Abdomen is soft.      Tenderness: There is no abdominal tenderness.   Genitourinary:     Comments: Deferred  Musculoskeletal:         General: Swelling and tenderness present. Normal range of motion.      Cervical back: Normal range of motion and neck supple.   Skin:     General: Skin is warm and dry.      Capillary Refill: Capillary refill takes less than 2 seconds.      Comments: Left thumb discoloration - necrosis, swelling, tenderness.  Left index finger necrosis.  Left middle finger discoloration, swelling, tenderness.  Left 5th finger discoloration tip necrosis, swelling, tenderness    Neurological:      General: No focal deficit present.      Mental Status: He is alert and oriented to person, place, and time.   Psychiatric:         Mood and Affect: Mood normal.         Behavior: Behavior normal.       Last Recorded Vitals  Blood pressure 116/78, pulse 91, temperature 36.9 °C (98.4 °F), temperature source Oral, resp. rate 17, height 1.829 m (6'), weight 119 kg (263 lb), SpO2 91 %.    Intake/Output last 3 Shifts:  I/O last 3 completed shifts:  In: 2230 (18.7 mL/kg) [P.O.:480; IV Piggyback:1750]  Out: - (0 mL/kg)   Weight: 119.3 kg     Telemetry normal sinus rhythm rate 80's    Relevant Results  Results for orders placed or performed during the hospital encounter of 01/28/24 (from the past 24 hour(s))   Basic Metabolic Panel   Result Value Ref Range    Glucose 116 (H) 65 - 99 mg/dL    Sodium 132 (L) 133 - 145 mmol/L    Potassium 4.5 3.4 - 5.1 mmol/L    Chloride 99 97 - 107 mmol/L    Bicarbonate 23 (L) 24 - 31 mmol/L    Urea Nitrogen 19 8 - 25 mg/dL    Creatinine 0.80 0.40 - 1.60 mg/dL    eGFR >90 >60 mL/min/1.73m*2    Calcium 8.8 8.5 - 10.4 mg/dL    Anion Gap 10 <=19 mmol/L   CBC   Result Value Ref Range    WBC 7.0 4.4 - 11.3 x10*3/uL    nRBC 0.0 0.0 - 0.0 /100 WBCs    RBC 4.33 (L) 4.50 - 5.90 x10*6/uL    Hemoglobin 14.2 13.5 - 17.5 g/dL    Hematocrit 41.4 41.0 - 52.0 %    MCV 96 80 - 100 fL    MCH 32.8 26.0 - 34.0 pg    MCHC 34.3 32.0 - 36.0 g/dL    RDW 11.9 11.5 - 14.5 %    Platelets 186 150 - 450 x10*3/uL     No results found.    Scheduled medications  buprenorphine-naloxone, 1 Film, sublingual, TID  gabapentin, 300 mg, oral, TID  lisinopril, 20 mg, oral, Daily  meropenem, 1 g, intravenous, q8h  nicotine, 1 patch, transdermal, Daily  tamsulosin, 0.4 mg, oral, Daily  vancomycin-diluent combo no.1, 1,250 mg, intravenous, q12h      Continuous medications     PRN medications  PRN medications: acetaminophen **OR** acetaminophen **OR** acetaminophen, benzocaine-menthol, dextromethorphan-guaifenesin,  guaiFENesin, HYDROcodone-acetaminophen, ondansetron ODT, polyethylene glycol    ASSESSMENT:  Left hand frostbite  Neuropathy  Hypertension  BPH  Drug abuse    PLAN:  Patient is doing okay this morning.  No new issues.  Left hand, index finger, fifth finger necrosis.  Pain is controlled.  Infectious disease, Orthopedic surgery input appreciated.  Per Infectious disease, okay to discharge on p.o. Doxycycline and Levaquin thru 2/11/2024 with outpatient follow-up with Orthopedic surgery.  Betadine over necrotic skin once daily.  Outpatient follow-up with Orthopedic surgery in 2 weeks, potential amputation pending recovery.  Continue as needed analgesics.  P.r.n Tylenol on discharge.  Continue home medications.  Outpatient follow up with PCP in 1 week.  PT/OT signed off.  Ambulate.  Fall precautions.  Supportive care.  Patient reassured.  Case management following for discharge planning.  Discharge plan home.  Discussed with Dr. Gonzalez.  Vicky to discharge home.  See discharge orders and instructions.    Angelica Morales, JAMARCUS-CNP

## 2024-02-02 NOTE — DISCHARGE SUMMARY
Discharge Diagnosis  Frostbite of hand, left, initial encounter    Issues Requiring Follow-Up  Frostbite    Discharge Meds     Your medication list        START taking these medications        Instructions Last Dose Given Next Dose Due   acetaminophen 325 mg tablet  Commonly known as: Tylenol      Take 2 tablets (650 mg) by mouth every 4 hours if needed for mild pain (1 - 3).       doxycycline 100 mg capsule  Commonly known as: Vibramycin      Take 1 capsule (100 mg) by mouth 2 times a day for 10 days. Take with at least 8 ounces (large glass) of water, do not lie down for 30 minutes after       levoFLOXacin 750 mg tablet  Commonly known as: Levaquin      Take 1 tablet (750 mg) by mouth once daily for 10 days.       nicotine 21 mg/24 hr patch  Commonly known as: Nicoderm CQ  Start taking on: February 3, 2024      Place 1 patch over 24 hours on the skin once daily. Do not start before February 3, 2024.       povidone-iodine 10 % topical solution  Commonly known as: Betadine      Apply 1 Application topically to left hand necrotic skin areas once daily              CONTINUE taking these medications        Instructions Last Dose Given Next Dose Due   buprenorphine-naloxone 8-2 mg SL film  Commonly known as: Suboxone           gabapentin 300 mg capsule  Commonly known as: Neurontin           lisinopril 20 mg tablet           tamsulosin 0.4 mg 24 hr capsule  Commonly known as: Flomax                     Where to Get Your Medications        These medications were sent to [facility]  [address] [id number]      Hours: 9 AM to 6 PM Mon-Fri, 9 AM to 1 PM Sat Phone: [phone]   doxycycline 100 mg capsule  levoFLOXacin 750 mg tablet  nicotine 21 mg/24 hr patch  povidone-iodine 10 % topical solution       Information about where to get these medications is not yet available    Ask your nurse or doctor about these medications  acetaminophen 325 mg tablet         Test Results Pending At  Discharge  Pending Labs       No current pending labs.          Hospital Course  This is a very pleasant 53-year-old  male who presented to the emergency department with frostbite of the left hand fingers.  In the emergency department, initial workup was done.  He was treated IV antibiotics and was admitted.  He was evaluated by Infectious disease and Orthopedic surgery.  He was treated with broad-spectrum IV antibiotics, local care, and as needed analgesics.  He was evaluated by physical, occupational therapy, discharged from therapy services.  His condition improved.  He was cleared by Orthopedic surgery for discharge, advised outpatient follow-up in 2 weeks for evaluation, potential amputation pending tissue recovery.  He was cleared by Infectious disease for discharge on oral Doxycycline, Levaquin through 2/11/2024.  He was discharged home in stable condition.    Pertinent Physical Exam At Time of Discharge  See physical examination    Outpatient Follow-Up  No future appointments.    Follow up with primary care provider in 1 week    Follow up with Orthopedic surgery in 2 weeks    Angelica Morales, JAMARCUS-CNP

## 2024-02-02 NOTE — PROGRESS NOTES
"Vancomycin Dosing by Pharmacy- FOLLOW UP    Chris Toribio is a 53 y.o. year old male who Pharmacy has been consulted for vancomycin dosing for cellulitis, skin and soft tissue. Based on the patient's indication and renal status this patient is being dosed based on a goal AUC of 400-600.     Renal function is currently stable.    Current vancomycin dose: 1250 mg given every 12 hours    Estimated vancomycin AUC on current dose: 503 mg/L.hr     Visit Vitals  /78 (BP Location: Right arm, Patient Position: Lying)   Pulse 91   Temp 36.9 °C (98.4 °F) (Oral)   Resp 17        Lab Results   Component Value Date    CREATININE 0.80 02/02/2024    CREATININE 0.70 02/01/2024    CREATININE 0.70 01/29/2024    CREATININE 0.80 01/28/2024        Patient weight is No results found for: \"PTWEIGHT\"    No results found for: \"CULTURE\"     I/O last 3 completed shifts:  In: 2230 (18.7 mL/kg) [P.O.:480; IV Piggyback:1750]  Out: - (0 mL/kg)   Weight: 119.3 kg   [unfilled]    No results found for: \"PATIENTTEMP\"     Assessment/Plan    Within goal AUC range. Continue current vancomycin regimen.    This dosing regimen is predicted by [employer] to result in the following pharmacokinetic parameters:  Loading dose: N/A  Regimen: 1250 mg IV every 12 hours.  Start time: 20:00 on 02/02/2024  Exposure target: AUC24 (range)400-600 mg/L.hr   AUC24,ss: 503 mg/L.hr  Probability of AUC24 > 400: 83 %  Ctrough,ss: 15.4 mg/L  Probability of Ctrough,ss > 20: 22 %  Probability of nephrotoxicity (Lodise CHARLEY 2009): 11 %    The next level will be obtained on 2/5 at 0500. May be obtained sooner if clinically indicated.   Will continue to monitor renal function daily while on vancomycin and order serum creatinine at least every 48 hours if not already ordered.  Follow for continued vancomycin needs, clinical response, and signs/symptoms of toxicity.       Ashley Wu, PharmD       "

## 2024-02-02 NOTE — PROGRESS NOTES
Chris Toribio is a 53 y.o. male on day 3 of admission presenting with Frostbite of hand, left, initial encounter.      Subjective  Patient lying in bed in no acute distress  Endorses unchanged left thumb and finger pain secondary to tissue necrosis  Denies chest pain or shortness of breath  No nausea or vomiting  No fever or chills    Objective    Last Recorded Vitals      1/31/2024     7:00 AM 1/31/2024     3:00 PM 1/31/2024    11:47 PM 2/1/2024     7:00 AM 2/1/2024     3:57 PM 2/1/2024    11:34 PM 2/2/2024     8:00 AM   Vitals   Systolic 116 136 120 122 128 110 116   Diastolic 85 75 63 79 89 64 78   Heart Rate 79 80 91 84 88 87 91   Temp 36.9 °C (98.4 °F) 36.4 °C (97.5 °F) 37.1 °C (98.8 °F) 36.5 °C (97.7 °F) 36.5 °C (97.7 °F) 36.9 °C (98.4 °F) 36.9 °C (98.4 °F)   Resp 18 18 19 18 17 18 17           Relevant Results  Results for orders placed or performed during the hospital encounter of 01/28/24 (from the past 24 hour(s))   Basic Metabolic Panel   Result Value Ref Range    Glucose 116 (H) 65 - 99 mg/dL    Sodium 132 (L) 133 - 145 mmol/L    Potassium 4.5 3.4 - 5.1 mmol/L    Chloride 99 97 - 107 mmol/L    Bicarbonate 23 (L) 24 - 31 mmol/L    Urea Nitrogen 19 8 - 25 mg/dL    Creatinine 0.80 0.40 - 1.60 mg/dL    eGFR >90 >60 mL/min/1.73m*2    Calcium 8.8 8.5 - 10.4 mg/dL    Anion Gap 10 <=19 mmol/L   CBC   Result Value Ref Range    WBC 7.0 4.4 - 11.3 x10*3/uL    nRBC 0.0 0.0 - 0.0 /100 WBCs    RBC 4.33 (L) 4.50 - 5.90 x10*6/uL    Hemoglobin 14.2 13.5 - 17.5 g/dL    Hematocrit 41.4 41.0 - 52.0 %    MCV 96 80 - 100 fL    MCH 32.8 26.0 - 34.0 pg    MCHC 34.3 32.0 - 36.0 g/dL    RDW 11.9 11.5 - 14.5 %    Platelets 186 150 - 450 x10*3/uL     Physical Exam  Constitutional:       Appearance: Normal appearance.   HENT:      Head: Normocephalic and atraumatic.      Nose: Nose normal.   Eyes:      Extraocular Movements: Extraocular movements intact.      Conjunctiva/sclera: Conjunctivae normal.   Cardiovascular:      Rate  and Rhythm: Regular rhythm.      Heart sounds: Normal heart sounds.   Pulmonary:      Breath sounds: Normal breath sounds.   Abdominal:      General: Bowel sounds are normal.      Palpations: Abdomen is soft.   Musculoskeletal:      Cervical back: Normal range of motion and neck supple.   Skin:     Comments: Left thumb, index, and fifth finger distal phalanx necrotic digits   Neurological:      Mental Status: He is alert.     No results found for the last 90 days.    Assessment/Plan   Left thumb, index finger and fifth finger frostbite-has risk for infection  Allergy to penicillin-tolerating meropenem        Continue meropenem-empiric  IV vancomycin-empiric  Pain management.  Hand surgery consult  Monitor evolution of frostbite  Supportive care  Monitor temperature and WBC  Long-term plan is oral antibiotic-considering doxycycline and Levaquin therapy with follow-up with hand surgeon in office-2/11/2024
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
## 2024-02-02 NOTE — CARE PLAN
The patient's goals for the shift include      The clinical goals for the shift include Pain management    Problem: Pain  Goal: Takes deep breaths with improved pain control throughout the shift  Outcome: Progressing  Goal: Turns in bed with improved pain control throughout the shift  Outcome: Progressing  Goal: Walks with improved pain control throughout the shift  Outcome: Progressing  Goal: Performs ADL's with improved pain control throughout shift  Outcome: Progressing  Goal: Participates in PT with improved pain control throughout the shift  Outcome: Progressing  Goal: Free from opioid side effects throughout the shift  Outcome: Progressing  Goal: Free from acute confusion related to pain meds throughout the shift  Outcome: Progressing

## 2024-02-02 NOTE — NURSING NOTE
Assumed care of patient at this time. Patient resting with eyes closed on L side. Patient's respirations equal and unlabored. Call light next to patient. Bed low and locked.

## 2024-02-21 ENCOUNTER — ANESTHESIA EVENT (OUTPATIENT)
Dept: OPERATING ROOM | Facility: HOSPITAL | Age: 54
End: 2024-02-21
Payer: COMMERCIAL

## 2024-02-21 ENCOUNTER — HOSPITAL ENCOUNTER (OUTPATIENT)
Facility: HOSPITAL | Age: 54
Setting detail: OUTPATIENT SURGERY
Discharge: HOME | End: 2024-02-21
Attending: ORTHOPAEDIC SURGERY | Admitting: ORTHOPAEDIC SURGERY
Payer: COMMERCIAL

## 2024-02-21 ENCOUNTER — PHARMACY VISIT (OUTPATIENT)
Dept: PHARMACY | Facility: CLINIC | Age: 54
End: 2024-02-21
Payer: MEDICAID

## 2024-02-21 ENCOUNTER — ANESTHESIA (OUTPATIENT)
Dept: OPERATING ROOM | Facility: HOSPITAL | Age: 54
End: 2024-02-21
Payer: COMMERCIAL

## 2024-02-21 VITALS
WEIGHT: 262.79 LBS | TEMPERATURE: 97.7 F | BODY MASS INDEX: 35.59 KG/M2 | RESPIRATION RATE: 16 BRPM | HEART RATE: 75 BPM | DIASTOLIC BLOOD PRESSURE: 87 MMHG | SYSTOLIC BLOOD PRESSURE: 131 MMHG | HEIGHT: 72 IN | OXYGEN SATURATION: 98 %

## 2024-02-21 DIAGNOSIS — I99.8 ISCHEMIC FINGER: ICD-10-CM

## 2024-02-21 DIAGNOSIS — T33.522A: Primary | ICD-10-CM
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
## 2024-02-21 PROCEDURE — 7100000009 HC PHASE TWO TIME - INITIAL BASE CHARGE: Performed by: ORTHOPAEDIC SURGERY

## 2024-02-21 PROCEDURE — A26910 PR AMPUTATE METACARPAL+FINGER: Performed by: ANESTHESIOLOGIST ASSISTANT

## 2024-02-21 PROCEDURE — 2500000005 HC RX 250 GENERAL PHARMACY W/O HCPCS

## 2024-02-21 PROCEDURE — 88305 TISSUE EXAM BY PATHOLOGIST: CPT | Mod: TC | Performed by: ORTHOPAEDIC SURGERY

## 2024-02-21 PROCEDURE — 3600000003 HC OR TIME - INITIAL BASE CHARGE - PROCEDURE LEVEL THREE: Performed by: ORTHOPAEDIC SURGERY

## 2024-02-21 PROCEDURE — A26910 PR AMPUTATE METACARPAL+FINGER: Performed by: ANESTHESIOLOGY

## 2024-02-21 PROCEDURE — 2500000004 HC RX 250 GENERAL PHARMACY W/ HCPCS (ALT 636 FOR OP/ED): Performed by: ANESTHESIOLOGIST ASSISTANT

## 2024-02-21 PROCEDURE — RXMED WILLOW AMBULATORY MEDICATION CHARGE

## 2024-02-21 PROCEDURE — 2500000004 HC RX 250 GENERAL PHARMACY W/ HCPCS (ALT 636 FOR OP/ED): Performed by: ORTHOPAEDIC SURGERY

## 2024-02-21 PROCEDURE — 2500000005 HC RX 250 GENERAL PHARMACY W/O HCPCS: Performed by: ANESTHESIOLOGIST ASSISTANT

## 2024-02-21 PROCEDURE — 3700000002 HC GENERAL ANESTHESIA TIME - EACH INCREMENTAL 1 MINUTE: Performed by: ORTHOPAEDIC SURGERY

## 2024-02-21 PROCEDURE — 7100000010 HC PHASE TWO TIME - EACH INCREMENTAL 1 MINUTE: Performed by: ORTHOPAEDIC SURGERY

## 2024-02-21 PROCEDURE — 88305 TISSUE EXAM BY PATHOLOGIST: CPT | Performed by: PATHOLOGY

## 2024-02-21 PROCEDURE — 88311 DECALCIFY TISSUE: CPT | Performed by: PATHOLOGY

## 2024-02-21 PROCEDURE — 7100000002 HC RECOVERY ROOM TIME - EACH INCREMENTAL 1 MINUTE: Performed by: ORTHOPAEDIC SURGERY

## 2024-02-21 PROCEDURE — 3600000008 HC OR TIME - EACH INCREMENTAL 1 MINUTE - PROCEDURE LEVEL THREE: Performed by: ORTHOPAEDIC SURGERY

## 2024-02-21 PROCEDURE — 7100000001 HC RECOVERY ROOM TIME - INITIAL BASE CHARGE: Performed by: ORTHOPAEDIC SURGERY

## 2024-02-21 PROCEDURE — 3700000001 HC GENERAL ANESTHESIA TIME - INITIAL BASE CHARGE: Performed by: ORTHOPAEDIC SURGERY

## 2024-02-21 PROCEDURE — 2500000004 HC RX 250 GENERAL PHARMACY W/ HCPCS (ALT 636 FOR OP/ED): Performed by: ANESTHESIOLOGY

## 2024-02-21 RX ORDER — LIDOCAINE HYDROCHLORIDE 10 MG/ML
INJECTION, SOLUTION EPIDURAL; INFILTRATION; INTRACAUDAL; PERINEURAL AS NEEDED
Status: DISCONTINUED | OUTPATIENT
Start: 2024-02-21 | End: 2024-02-21

## 2024-02-21 RX ORDER — VANCOMYCIN HYDROCHLORIDE 1 G/20ML
INJECTION, POWDER, LYOPHILIZED, FOR SOLUTION INTRAVENOUS AS NEEDED
Status: DISCONTINUED | OUTPATIENT
Start: 2024-02-21 | End: 2024-02-21 | Stop reason: HOSPADM

## 2024-02-21 RX ORDER — LIDOCAINE IN NACL,ISO-OSMOT/PF 30 MG/3 ML
SYRINGE (ML) INJECTION AS NEEDED
Status: DISCONTINUED | OUTPATIENT
Start: 2024-02-21 | End: 2024-02-21 | Stop reason: HOSPADM

## 2024-02-21 RX ORDER — BUPIVACAINE HYDROCHLORIDE 2.5 MG/ML
INJECTION, SOLUTION EPIDURAL; INFILTRATION; INTRACAUDAL AS NEEDED
Status: DISCONTINUED | OUTPATIENT
Start: 2024-02-21 | End: 2024-02-21 | Stop reason: HOSPADM

## 2024-02-21 RX ORDER — PHENYLEPHRINE HCL IN 0.9% NACL 1 MG/10 ML
SYRINGE (ML) INTRAVENOUS AS NEEDED
Status: DISCONTINUED | OUTPATIENT
Start: 2024-02-21 | End: 2024-02-21

## 2024-02-21 RX ORDER — GLYCOPYRROLATE 0.2 MG/ML
INJECTION INTRAMUSCULAR; INTRAVENOUS AS NEEDED
Status: DISCONTINUED | OUTPATIENT
Start: 2024-02-21 | End: 2024-02-21

## 2024-02-21 RX ORDER — HYDRALAZINE HYDROCHLORIDE 20 MG/ML
5 INJECTION INTRAMUSCULAR; INTRAVENOUS EVERY 30 MIN PRN
Status: DISCONTINUED | OUTPATIENT
Start: 2024-02-21 | End: 2024-02-21 | Stop reason: HOSPADM

## 2024-02-21 RX ORDER — FENTANYL CITRATE 50 UG/ML
50 INJECTION, SOLUTION INTRAMUSCULAR; INTRAVENOUS EVERY 5 MIN PRN
Status: DISCONTINUED | OUTPATIENT
Start: 2024-02-21 | End: 2024-02-21 | Stop reason: HOSPADM

## 2024-02-21 RX ORDER — CLINDAMYCIN PHOSPHATE 900 MG/50ML
INJECTION, SOLUTION INTRAVENOUS AS NEEDED
Status: DISCONTINUED | OUTPATIENT
Start: 2024-02-21 | End: 2024-02-21

## 2024-02-21 RX ORDER — FENTANYL CITRATE 50 UG/ML
INJECTION, SOLUTION INTRAMUSCULAR; INTRAVENOUS AS NEEDED
Status: DISCONTINUED | OUTPATIENT
Start: 2024-02-21 | End: 2024-02-21

## 2024-02-21 RX ORDER — PROPOFOL 10 MG/ML
INJECTION, EMULSION INTRAVENOUS CONTINUOUS PRN
Status: DISCONTINUED | OUTPATIENT
Start: 2024-02-21 | End: 2024-02-21

## 2024-02-21 RX ORDER — ONDANSETRON HYDROCHLORIDE 2 MG/ML
4 INJECTION, SOLUTION INTRAVENOUS ONCE AS NEEDED
Status: DISCONTINUED | OUTPATIENT
Start: 2024-02-21 | End: 2024-02-21 | Stop reason: HOSPADM

## 2024-02-21 RX ORDER — CEFAZOLIN SODIUM 2 G/100ML
2 INJECTION, SOLUTION INTRAVENOUS ONCE
Status: DISCONTINUED | OUTPATIENT
Start: 2024-02-21 | End: 2024-02-21 | Stop reason: HOSPADM

## 2024-02-21 RX ORDER — SODIUM CHLORIDE, SODIUM LACTATE, POTASSIUM CHLORIDE, CALCIUM CHLORIDE 600; 310; 30; 20 MG/100ML; MG/100ML; MG/100ML; MG/100ML
100 INJECTION, SOLUTION INTRAVENOUS CONTINUOUS
Status: DISCONTINUED | OUTPATIENT
Start: 2024-02-21 | End: 2024-02-21 | Stop reason: HOSPADM

## 2024-02-21 RX ORDER — MIDAZOLAM HYDROCHLORIDE 1 MG/ML
INJECTION, SOLUTION INTRAMUSCULAR; INTRAVENOUS AS NEEDED
Status: DISCONTINUED | OUTPATIENT
Start: 2024-02-21 | End: 2024-02-21

## 2024-02-21 RX ORDER — PROPOFOL 10 MG/ML
INJECTION, EMULSION INTRAVENOUS AS NEEDED
Status: DISCONTINUED | OUTPATIENT
Start: 2024-02-21 | End: 2024-02-21

## 2024-02-21 RX ORDER — OXYCODONE AND ACETAMINOPHEN 5; 325 MG/1; MG/1
1 TABLET ORAL EVERY 6 HOURS PRN
Qty: 20 TABLET | Refills: 0 | Status: SHIPPED | OUTPATIENT
Start: 2024-02-21

## 2024-02-21 RX ORDER — LIDOCAINE HYDROCHLORIDE 10 MG/ML
0.1 INJECTION INFILTRATION; PERINEURAL ONCE
Status: DISCONTINUED | OUTPATIENT
Start: 2024-02-21 | End: 2024-02-21 | Stop reason: HOSPADM

## 2024-02-21 RX ORDER — MIDAZOLAM HYDROCHLORIDE 1 MG/ML
1 INJECTION, SOLUTION INTRAMUSCULAR; INTRAVENOUS ONCE AS NEEDED
Status: DISCONTINUED | OUTPATIENT
Start: 2024-02-21 | End: 2024-02-21 | Stop reason: HOSPADM

## 2024-02-21 RX ORDER — ALBUTEROL SULFATE 0.83 MG/ML
2.5 SOLUTION RESPIRATORY (INHALATION) ONCE AS NEEDED
Status: DISCONTINUED | OUTPATIENT
Start: 2024-02-21 | End: 2024-02-21 | Stop reason: HOSPADM

## 2024-02-21 RX ORDER — IBUPROFEN 600 MG/1
600 TABLET ORAL 4 TIMES DAILY PRN
Qty: 30 TABLET | Refills: 0 | Status: SHIPPED | OUTPATIENT
Start: 2024-02-21

## 2024-02-21 RX ADMIN — PROPOFOL 30 MG: 10 INJECTION, EMULSION INTRAVENOUS at 12:05

## 2024-02-21 RX ADMIN — Medication 50 MCG: at 12:36

## 2024-02-21 RX ADMIN — CLINDAMYCIN IN 5 PERCENT DEXTROSE 900 MG: 18 INJECTION, SOLUTION INTRAVENOUS at 12:03

## 2024-02-21 RX ADMIN — LIDOCAINE HYDROCHLORIDE 50 MG: 10 INJECTION, SOLUTION EPIDURAL; INFILTRATION; INTRACAUDAL; PERINEURAL at 12:02

## 2024-02-21 RX ADMIN — PROPOFOL 50 MG: 10 INJECTION, EMULSION INTRAVENOUS at 12:03

## 2024-02-21 RX ADMIN — PROPOFOL 10 MG: 10 INJECTION, EMULSION INTRAVENOUS at 12:57

## 2024-02-21 RX ADMIN — HYDROMORPHONE HYDROCHLORIDE 0.4 MG: 1 INJECTION, SOLUTION INTRAMUSCULAR; INTRAVENOUS; SUBCUTANEOUS at 13:18

## 2024-02-21 RX ADMIN — SODIUM CHLORIDE, SODIUM LACTATE, POTASSIUM CHLORIDE, AND CALCIUM CHLORIDE 100 ML/HR: 600; 310; 30; 20 INJECTION, SOLUTION INTRAVENOUS at 10:46

## 2024-02-21 RX ADMIN — GLYCOPYRROLATE 0.2 MG: 0.2 INJECTION INTRAMUSCULAR; INTRAVENOUS at 11:52

## 2024-02-21 RX ADMIN — FENTANYL CITRATE 50 MCG: 0.05 INJECTION, SOLUTION INTRAMUSCULAR; INTRAVENOUS at 13:30

## 2024-02-21 RX ADMIN — PROPOFOL 83.89 MCG/KG/MIN: 10 INJECTION, EMULSION INTRAVENOUS at 12:06

## 2024-02-21 RX ADMIN — FENTANYL CITRATE 50 MCG: 0.05 INJECTION, SOLUTION INTRAMUSCULAR; INTRAVENOUS at 13:35

## 2024-02-21 RX ADMIN — FENTANYL CITRATE 100 MCG: 50 INJECTION, SOLUTION INTRAMUSCULAR; INTRAVENOUS at 11:52

## 2024-02-21 RX ADMIN — MIDAZOLAM 4 MG: 1 INJECTION INTRAMUSCULAR; INTRAVENOUS at 11:52

## 2024-02-21 RX ADMIN — HYDROMORPHONE HYDROCHLORIDE 0.4 MG: 1 INJECTION, SOLUTION INTRAMUSCULAR; INTRAVENOUS; SUBCUTANEOUS at 13:23

## 2024-02-21 SDOH — HEALTH STABILITY: MENTAL HEALTH: CURRENT SMOKER: 0

## 2024-02-21 ASSESSMENT — PAIN SCALES - GENERAL
PAINLEVEL_OUTOF10: 5 - MODERATE PAIN
PAIN_LEVEL: 2
PAINLEVEL_OUTOF10: 6
PAINLEVEL_OUTOF10: 4
PAINLEVEL_OUTOF10: 4
PAINLEVEL_OUTOF10: 8
PAINLEVEL_OUTOF10: 8
PAINLEVEL_OUTOF10: 7
PAINLEVEL_OUTOF10: 2

## 2024-02-21 ASSESSMENT — PAIN - FUNCTIONAL ASSESSMENT
PAIN_FUNCTIONAL_ASSESSMENT: 0-10

## 2024-02-21 ASSESSMENT — PAIN DESCRIPTION - DESCRIPTORS
DESCRIPTORS: BURNING
DESCRIPTORS: ACHING

## 2024-02-21 NOTE — ANESTHESIA PREPROCEDURE EVALUATION
Patient: Chris Toribio    Procedure Information       Date/Time: 02/21/24 1115    Procedures:       Amputation Digit Hand (Left: Fingers)      Debridement Hand PAT ON ADMIT (Left: Fingers)    Location: [facility] OR 05 / [facility]    Surgeons: Tirso Craig MD            Relevant Problems   No relevant active problems       Clinical information reviewed:   Tobacco  Allergies  Meds   Med Hx  Surg Hx   Fam Hx  Soc Hx        NPO Detail:  NPO/Void Status  Carbohydrate Drink Given Prior to Surgery? : N  Date of Last Liquid: 02/20/24  Time of Last Liquid: 2100  Date of Last Solid: 02/20/24  Time of Last Solid: 2100  Time of Last Void: 0900         Physical Exam    Airway  Mallampati: II  TM distance: >3 FB  Neck ROM: full     Cardiovascular - normal exam     Dental - normal exam     Pulmonary - normal exam     Abdominal - normal exam           Anesthesia Plan    History of general anesthesia?: yes  History of complications of general anesthesia?: no    ASA 2     general     The patient is not a current smoker.  Patient was not previously instructed to abstain from smoking on day of procedure.  Patient did not smoke on day of procedure.  Education provided regarding risk of obstructive sleep apnea.  intravenous induction   Postoperative administration of opioids is intended.  Trial extubation is planned.  Anesthetic plan and risks discussed with patient.  Use of blood products discussed with patient who consented to blood products.    Plan discussed with CAA, attending and CRNA.

## 2024-02-21 NOTE — ANESTHESIA POSTPROCEDURE EVALUATION
Patient: Chris Toribio    Procedure Summary       Date: 02/21/24 Room / Location: [facility] OR 05 / [facility]    Anesthesia Start: 1152 Anesthesia Stop: 1313    Procedures:       Amputation Digit Hand (Left: Fingers)      Debridement Hand PAT ON ADMIT (Left: Fingers) Diagnosis:       Ischemic finger      (ISCHEMIA /FROSTBITE LEFT INDEX, SMALL AND THUMB)    Surgeons: Tirso Craig MD Responsible Provider: Lucio Damon MD    Anesthesia Type: general ASA Status: 2            Anesthesia Type: general    Vitals Value Taken Time   /81 02/21/24 1356   Temp 36.2 °C (97.2 °F) 02/21/24 1355   Pulse 72 02/21/24 1359   Resp 15 02/21/24 1359   SpO2 90 % 02/21/24 1358   Vitals shown include unvalidated device data.    Anesthesia Post Evaluation    Patient location during evaluation: PACU  Patient participation: complete - patient participated  Level of consciousness: sleepy but conscious  Pain score: 2  Pain management: adequate  Multimodal analgesia pain management approach  Airway patency: patent  Cardiovascular status: acceptable  Respiratory status: acceptable  Hydration status: acceptable  Postoperative Nausea and Vomiting: none        There were no known notable events for this encounter.
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
## 2024-02-23 LAB
LABORATORY COMMENT REPORT: NORMAL
PATH REPORT.FINAL DX SPEC: NORMAL
PATH REPORT.GROSS SPEC: NORMAL
PATH REPORT.RELEVANT HX SPEC: NORMAL
PATH REPORT.TOTAL CANCER: NORMAL

## (undated) DEVICE — SOLUTION, IRRIGATION, X RX SODIUM CHL 0.9%, 1000ML BTL

## (undated) DEVICE — BANDAGE, COMPRESSION, KNITTED, ELASTIC, SNGL VELCRO, 3IN, WHITE

## (undated) DEVICE — DRESSING, GAUZE, SPONGE, VERSALON, 4 PLY, 2 X 2 IN, STERILE

## (undated) DEVICE — TOURNIQUET, DIGIT, TOURNI-COT, LARGE

## (undated) DEVICE — CORD, CAUTERY, BIOPOLAR FORCEP, 12FT

## (undated) DEVICE — SPONGE, GAUZE, AVANT, STERILE, NONWOVEN, 4PLY, 4 X 4, STANDARD

## (undated) DEVICE — GOWN, SURGICAL, SIRUS, NON REINFORCED, LARGE

## (undated) DEVICE — PADDING, UNDERCAST, WEBRIL, 3 IN X 4 YD, REG, NS

## (undated) DEVICE — DRAPE, SHEET, LARGE, 70 X 85IN, STERILE

## (undated) DEVICE — Device

## (undated) DEVICE — SOLUTION, INJECTION, SODIUM CHLORIDE 9%, 3000ML

## (undated) DEVICE — DRESSING, GAUZE, PETROLATUM, PATCH, XEROFORM, 1 X 8 IN, STERILE

## (undated) DEVICE — SUTURE, ETHILON, 3-0, 18 IN, PS1, BLACK

## (undated) DEVICE — GLOVE, SURGICAL, PROTEXIS PI BLUE W/NEUTHERA, 7.5, PF, LF

## (undated) DEVICE — SUTURE, PROLENE 4-0, 18IN, PS2, BLUE MONO

## (undated) DEVICE — COVER, MAYO STAND, W/PAD, 23 IN, DISPOSABLE, PLASTIC, LF, STERILE